# Patient Record
Sex: FEMALE | Race: WHITE | NOT HISPANIC OR LATINO | Employment: FULL TIME | URBAN - METROPOLITAN AREA
[De-identification: names, ages, dates, MRNs, and addresses within clinical notes are randomized per-mention and may not be internally consistent; named-entity substitution may affect disease eponyms.]

---

## 2017-11-01 ENCOUNTER — TRANSCRIBE ORDERS (OUTPATIENT)
Dept: ADMINISTRATIVE | Facility: HOSPITAL | Age: 52
End: 2017-11-01

## 2017-11-01 DIAGNOSIS — R55 SYNCOPE AND COLLAPSE: Primary | ICD-10-CM

## 2017-11-13 ENCOUNTER — HOSPITAL ENCOUNTER (OUTPATIENT)
Dept: RADIOLOGY | Facility: HOSPITAL | Age: 52
Discharge: HOME | End: 2017-11-13
Payer: COMMERCIAL

## 2017-11-13 DIAGNOSIS — R55 SYNCOPE AND COLLAPSE: ICD-10-CM

## 2017-11-13 PROCEDURE — A9585 GADOBUTROL INJECTION: HCPCS | Performed by: RADIOLOGY

## 2017-11-13 PROCEDURE — 70553 MRI BRAIN STEM W/O & W/DYE: CPT

## 2017-11-13 RX ADMIN — GADOBUTROL 10 ML: 604.72 INJECTION INTRAVENOUS at 20:53

## 2017-12-08 ENCOUNTER — OFFICE VISIT (OUTPATIENT)
Dept: URGENT CARE | Facility: MEDICAL CENTER | Age: 52
End: 2017-12-08
Payer: COMMERCIAL

## 2017-12-08 ENCOUNTER — APPOINTMENT (OUTPATIENT)
Dept: RADIOLOGY | Facility: MEDICAL CENTER | Age: 52
End: 2017-12-08
Payer: COMMERCIAL

## 2017-12-08 ENCOUNTER — TRANSCRIBE ORDERS (OUTPATIENT)
Dept: URGENT CARE | Facility: MEDICAL CENTER | Age: 52
End: 2017-12-08

## 2017-12-08 DIAGNOSIS — M79.605 PAIN OF LEFT LEG: ICD-10-CM

## 2017-12-08 PROCEDURE — 73590 X-RAY EXAM OF LOWER LEG: CPT

## 2017-12-08 PROCEDURE — 73630 X-RAY EXAM OF FOOT: CPT

## 2017-12-08 PROCEDURE — 73610 X-RAY EXAM OF ANKLE: CPT

## 2017-12-08 PROCEDURE — S9083 URGENT CARE CENTER GLOBAL: HCPCS

## 2017-12-08 PROCEDURE — G0382 LEV 3 HOSP TYPE B ED VISIT: HCPCS

## 2017-12-15 NOTE — PROGRESS NOTES
Assessment  1  Contusion of left leg (924 5) (S80 12XA)   2  Left ankle sprain (845 00) (K44 282U)    Plan   Left ankle sprain    · Ace Bandage; Status:Complete;   Done: 07DYL7815   · Air cast; Status:Complete;   Done: 21WPS5460   · Crutches; Status:Active; Requested for:10Dih9309;   * XR FOOT 3+ VIEW LEFT; Status:Resulted - Requires Verification;   Done: 83HFO1244 12:00AM ZPZ:[id number];UUIBSFQ; Stat;   For:Left leg pain; Ordered By:Chiquita Charlton;  * XR ANKLE 3+ VIEW LEFT; Status:Resulted - Requires Verification;   Done: 68OEU2810 12:00AM Due:90Crd5132; Ordered; For:Left leg pain; Ordered By:Chiquita Charlton;  * XR TIBIA FIBULA 2 VIEW LEFT; Status:Resulted - Requires Verification;   Done: 65YJO9236 12:00AM Due:38Ymq8734; Ordered; For:Left leg pain; Ordered By:Chiquita Charlton;    Discussion/Summary  Discussion Summary:   1  Left ankle/leg pain-X-rays as reviewed by myself shows no acute fracture- if the radiology read differs I will call you-Do RICE protocol at home (rest, ice, compression and elevation)-Wear ace/aircast and use crutches-Take ibuprofen every 6-8 hours as needed for pain with food-Follow-up with your primary doctor for re-evaluation within 1 week-If you have worsening symptoms or continue to have pain, you should follow-up with an orthopedist    Understands and agrees with treatment plan: The treatment plan was reviewed with the patient/guardian  The patient/guardian understands and agrees with the treatment plan      Chief Complaint  1  Ankle Pain  Chief Complaint Free Text Note Form: Pt c/o L ankle pain and tingling from tripping over an elliptical machine @1hour ago  History of Present Illness  HPI: Patient is a 50year old female with no pertinent history presenting with left ankle and calf pain for 1 hour  States she tripped over her elliptical machine at home but does not know how she injured herself  She denies head trauma  Her pain is 4/10 at rest and 7/10 when ambulating   The pain is a shooting pain that occurs down her lateral calf to her lateral left ankle  She was able to ambulate immediately following the injury and currently  The ankle was immediately painful and began to swell within minutes  She applied ice to address the swelling  Reports decreased ROM of left dorsiflexion and plantarflexion  Reports numbness and tingling down her left lateral calf and in her left hallux and 2nd and 3rd digits  Denies fracture or injury to the left ankle or foot  Hospital Based Practices Required Assessment:  Pain Assessment  the patient states they have pain  The pain is located in the l ankle  The pain radiates to the calf  (on a scale of 0 to 10, the patient rates the pain at 4 )  Abuse And Domestic Violence Screen   Yes, the patient is safe at home  --   Domestic violence screen not done today  Reason DV Screen not done: not alone   Depression And Suicide Screen  Suicide screen not done today  -- Reason suicide screen not done: not alone  Prefered Language is  english  Primary Language is  english  Review of Systems  Focused-Female:  Constitutional: no fever-- and-- no chills  Cardiovascular: no chest pain,-- no intermittent leg claudication-- and-- no palpitations  Respiratory: no shortness of breath-- and-- no shortness of breath during exertion  Gastrointestinal: no abdominal pain,-- no nausea,-- no vomiting-- and-- no diarrhea  Musculoskeletal: joint swelling-- and-- joint stiffness  Neurological: numbness-- and-- dizziness, but-- as noted in HPI  Active Problems  1  Atypical chest pain (786 59) (R07 89)   2  Contusion of left thumb without damage to nail, initial encounter (923 3) (S60 012A)   3  De Quervain's tenosynovitis (727 04) (M65 4)   4  Localized primary osteoarthrosis of carpometacarpal joint of right wrist (715 14) (M19 031)   5  Osteoarthritis (715 90) (M19 90)   6  Palpitations (785 1) (R00 2)   7  Postoperative examination (V67 00) (Z09)   8   Pre-operative cardiovascular examination (V72 81) (Z01 810)   9  Sinus bradycardia (427 89) (R00 1)   10  Sprain of ulnar collateral ligament of elbow, left, initial encounter (841 1) (S53 442A)   11  Supraventricular tachycardia (427 89) (I47 1)   12  Thumb injury (959 5) (S69 90XA)    Past Medical History  1  History of Benign essential hypertension (401 1) (I10)   2  History of Calculus of gallbladder with chronic cholecystitis without obstruction (574 10) (K80 10)   3  History of Denial Of Any Significant Medical History  Active Problems And Past Medical History Reviewed: The active problems and past medical history were reviewed and updated today  Family History  Mother    1  Family history of Chronic Obstructive Pulmonary Disease  Father    2  Family history of Reported Family History Of Heart Disease  Maternal Uncle    3  Family history of Cirrhosis   4  Family history of Prostate Cancer (P78 16)  Family History Reviewed: The family history was reviewed and updated today  Social History   · Being A Social Drinker   · Former smoker (R40 58) (I94 321)   · Marital History - Single   · Never Used Drugs   · Uses Safety Equipment - Seatbelts  Social History Reviewed: The social history was reviewed and updated today  The social history was reviewed and is unchanged  Surgical History  1  History of Catheter Ablation Atrioventricular Node   2  History of Cervical Vertebral Fusion   3  History of Lumbar Vertebral Fusion  Surgical History Reviewed: The surgical history was reviewed and updated today  Current Meds   1  Calcium 1200 7276-7360 MG-UNIT Oral Tablet Chewable; Therapy: (Recorded:45Brb4080) to Recorded   2  Omeprazole 40 MG Oral Capsule Delayed Release; TAKE 1 CAPSULE TWICE DAILY; Therapy: (Recorded:53Qyy9813) to Recorded   3  VESIcare 5 MG Oral Tablet; TAKE 1 TABLET DAILY; Therapy: 23KBB8732 to Recorded  Medication List Reviewed: The medication list was reviewed and updated today  color  No rashes      Signatures   Electronically signed by : Amelia Sheikh [facility]; Dec  8 2017  7:36PM EST                       (Author)    Electronically signed by : ANGELICA Young ; Dec 14 2017 11:43AM EST                       (Co-author)

## 2018-01-12 ENCOUNTER — TRANSCRIBE ORDERS (OUTPATIENT)
Dept: ADMINISTRATIVE | Facility: HOSPITAL | Age: 53
End: 2018-01-12

## 2018-01-12 ENCOUNTER — APPOINTMENT (OUTPATIENT)
Dept: LAB | Facility: HOSPITAL | Age: 53
End: 2018-01-12
Payer: COMMERCIAL

## 2018-01-12 DIAGNOSIS — R55 SYNCOPE AND COLLAPSE: ICD-10-CM

## 2018-01-12 DIAGNOSIS — R55 SYNCOPE AND COLLAPSE: Primary | ICD-10-CM

## 2018-01-12 LAB
ALBUMIN SERPL BCP-MCNC: 3.9 G/DL (ref 3.5–5)
ALP SERPL-CCNC: 69 U/L (ref 46–116)
ALT SERPL W P-5'-P-CCNC: 25 U/L (ref 12–78)
ANION GAP SERPL CALCULATED.3IONS-SCNC: 8 MMOL/L (ref 4–13)
AST SERPL W P-5'-P-CCNC: 17 U/L (ref 5–45)
BASOPHILS # BLD AUTO: 0.02 THOUSANDS/ΜL (ref 0–0.1)
BASOPHILS NFR BLD AUTO: 0 % (ref 0–1)
BILIRUB SERPL-MCNC: 0.7 MG/DL (ref 0.2–1)
BUN SERPL-MCNC: 13 MG/DL (ref 5–25)
CALCIUM SERPL-MCNC: 9.3 MG/DL (ref 8.3–10.1)
CHLORIDE SERPL-SCNC: 106 MMOL/L (ref 100–108)
CO2 SERPL-SCNC: 27 MMOL/L (ref 21–32)
CREAT SERPL-MCNC: 0.84 MG/DL (ref 0.6–1.3)
EOSINOPHIL # BLD AUTO: 0.12 THOUSAND/ΜL (ref 0–0.61)
EOSINOPHIL NFR BLD AUTO: 2 % (ref 0–6)
ERYTHROCYTE [DISTWIDTH] IN BLOOD BY AUTOMATED COUNT: 13.8 % (ref 11.6–15.1)
GFR SERPL CREATININE-BSD FRML MDRD: 81 ML/MIN/1.73SQ M
GLUCOSE SERPL-MCNC: 108 MG/DL (ref 65–140)
HCT VFR BLD AUTO: 42.4 % (ref 34.8–46.1)
HGB BLD-MCNC: 14.7 G/DL (ref 11.5–15.4)
LYMPHOCYTES # BLD AUTO: 2.05 THOUSANDS/ΜL (ref 0.6–4.47)
LYMPHOCYTES NFR BLD AUTO: 33 % (ref 14–44)
MCH RBC QN AUTO: 30 PG (ref 26.8–34.3)
MCHC RBC AUTO-ENTMCNC: 34.7 G/DL (ref 31.4–37.4)
MCV RBC AUTO: 87 FL (ref 82–98)
MONOCYTES # BLD AUTO: 0.47 THOUSAND/ΜL (ref 0.17–1.22)
MONOCYTES NFR BLD AUTO: 8 % (ref 4–12)
NEUTROPHILS # BLD AUTO: 3.53 THOUSANDS/ΜL (ref 1.85–7.62)
NEUTS SEG NFR BLD AUTO: 57 % (ref 43–75)
PLATELET # BLD AUTO: 203 THOUSANDS/UL (ref 149–390)
PMV BLD AUTO: 9.9 FL (ref 8.9–12.7)
POTASSIUM SERPL-SCNC: 3.8 MMOL/L (ref 3.5–5.3)
PROT SERPL-MCNC: 7.5 G/DL (ref 6.4–8.2)
RBC # BLD AUTO: 4.9 MILLION/UL (ref 3.81–5.12)
SODIUM SERPL-SCNC: 141 MMOL/L (ref 136–145)
T4 FREE SERPL-MCNC: 0.86 NG/DL (ref 0.76–1.46)
TROPONIN I SERPL-MCNC: <0.02 NG/ML
TSH SERPL DL<=0.05 MIU/L-ACNC: 1.59 UIU/ML (ref 0.36–3.74)
WBC # BLD AUTO: 6.19 THOUSAND/UL (ref 4.31–10.16)

## 2018-01-12 PROCEDURE — 85025 COMPLETE CBC W/AUTO DIFF WBC: CPT

## 2018-01-12 PROCEDURE — 84484 ASSAY OF TROPONIN QUANT: CPT

## 2018-01-12 PROCEDURE — 84443 ASSAY THYROID STIM HORMONE: CPT

## 2018-01-12 PROCEDURE — 80053 COMPREHEN METABOLIC PANEL: CPT

## 2018-01-12 PROCEDURE — 36415 COLL VENOUS BLD VENIPUNCTURE: CPT

## 2018-01-12 PROCEDURE — 84439 ASSAY OF FREE THYROXINE: CPT

## 2018-01-23 VITALS
SYSTOLIC BLOOD PRESSURE: 120 MMHG | RESPIRATION RATE: 16 BRPM | DIASTOLIC BLOOD PRESSURE: 66 MMHG | BODY MASS INDEX: 32.21 KG/M2 | TEMPERATURE: 98.5 F | WEIGHT: 225 LBS | HEIGHT: 70 IN | OXYGEN SATURATION: 99 % | HEART RATE: 76 BPM

## 2018-05-03 ENCOUNTER — TRANSCRIBE ORDERS (OUTPATIENT)
Dept: ADMINISTRATIVE | Facility: HOSPITAL | Age: 53
End: 2018-05-03

## 2018-05-03 DIAGNOSIS — M75.102 ROTATOR CUFF SYNDROME OF LEFT SHOULDER: Primary | ICD-10-CM

## 2018-05-08 ENCOUNTER — EVALUATION (OUTPATIENT)
Dept: PHYSICAL THERAPY | Facility: REHABILITATION | Age: 53
End: 2018-05-08
Payer: COMMERCIAL

## 2018-05-08 DIAGNOSIS — M25.512 ACUTE PAIN OF LEFT SHOULDER: Primary | ICD-10-CM

## 2018-05-08 PROCEDURE — G8984 CARRY CURRENT STATUS: HCPCS | Performed by: PHYSICAL THERAPIST

## 2018-05-08 PROCEDURE — 97162 PT EVAL MOD COMPLEX 30 MIN: CPT | Performed by: PHYSICAL THERAPIST

## 2018-05-08 PROCEDURE — 97140 MANUAL THERAPY 1/> REGIONS: CPT | Performed by: PHYSICAL THERAPIST

## 2018-05-08 PROCEDURE — 97110 THERAPEUTIC EXERCISES: CPT | Performed by: PHYSICAL THERAPIST

## 2018-05-08 PROCEDURE — G8985 CARRY GOAL STATUS: HCPCS | Performed by: PHYSICAL THERAPIST

## 2018-05-08 NOTE — LETTER
May 9, 2018    MD Phyllis Meyer [address]  [address]     Patient: Sascha Lao   YOB: 1965   Date of Visit: 2018     Encounter Diagnosis     ICD-10-CM    1  Acute pain of left shoulder M25 512        Dear Dr Devries La:    Please review the attached Plan of Care from [facility] recent visit  Please verify that you agree therapy should continue by signing the attached document and sending it back to our office  If you have any questions or concerns, please don't hesitate to call  Sincerely,    Ferny Rojas, PT      Referring Provider:      I certify that I have read the below Plan of Care and certify the need for these services furnished under this plan of treatment while under my care  MD Phyllis Meyer [address]  [address]   VIA Facsimile: [phone]          PT Evaluation     Today's date: 2018  Patient name: Sascha Lao  : 1965  MRN: [id number]  Referring provider: Skye Phillips MD  Dx:   Encounter Diagnosis     ICD-10-CM    1  Acute pain of left shoulder M25 512        Start Time: 1450  Stop Time: 1545  Total time in clinic (min): 55 minutes    Assessment  Impairments: abnormal muscle tone, abnormal or restricted ROM, activity intolerance, impaired physical strength, lacks appropriate home exercise program, pain with function and scapular dyskinesis  Functional limitations: ADL's, any activity with overhead reaching, certain work tasks  Assessment details: Sascha Lao is a 48 y o  female who presents with pain, decreased strength, decreased ROM, decreased joint mobility and postural  dysfunction  Due to these impairments, Patient has difficulty performing a/iadls and work-related activities  Patient's clinical presentation is consistent with their referring diagnosis of left shoulder pain    Patient would benefit from skilled physical therapy to address their aforementioned impairments, improve their level of function and to improve their overall quality of life  Understanding of Dx/Px/POC: excellent   Prognosis: good    Goals  Short Term:  Pt will report decreased levels of pain by at least 2 subjective ratings in 4 weeks  Pt will demonstrate improved ROM by at least 10 degrees in 4 weeks  Pt will improve stength by 1/2 grade MMT  Long Term:   Pt will be independent in their HEP in 8 weeks  Pt will demonstrate improved FOTO, > 67  Pt will return to maximal functional with all overhead activities  Pt will perform all job duties without pain or restriction  Plan  Patient would benefit from: PT eval and skilled PT  Planned modality interventions: low level laser therapy  Planned therapy interventions: home exercise program, therapeutic exercise, therapeutic activities, stretching, strengthening, postural training, patient education, neuromuscular re-education, manual therapy, joint mobilization, IADL retraining, body mechanics training and functional ROM exercises  Frequency: 2x week  Duration in weeks: 12  Treatment plan discussed with: patient        Subjective Evaluation    History of Present Illness  Date of onset: 2018  Mechanism of injury: unknown  Not a recurrent problem   Quality of life: fair    Pain  Current pain ratin  At best pain ratin  At worst pain rating: 10  Quality: knife-like, dull ache, sharp and radiating  Aggravating factors: overhead activity and lifting  Progression: no change    Hand dominance: right      Diagnostic Tests    FCE comments: Scheduled for an MRI on 2018 Treatments  Previous treatment: injection treatment  Patient Goals  Patient goals for therapy: decreased pain, increased motion, increased strength and independence with ADLs/IADLs          Objective     Static Posture     Shoulders  Depressed and rounded  Palpation   Left   Tenderness of the infraspinatus and teres minor     Trigger point to infraspinatus, teres minor and upper trapezius  Cervical/Thoracic Screen   Cervical range of motion within normal limits    Neurological Testing     Sensation     Shoulder   Left Shoulder   Intact: light touch and proprioception    Right Shoulder   Intact: light touch and proprioception    Reflexes   Left   Biceps (C5/C6): normal (2+)  Brachioradialis (C6): normal (2+)    Right   Biceps (C5/C6): normal (2+)  Brachioradialis (C6): normal (2+)    Active Range of Motion   Left Shoulder   Flexion: 86 degrees   Abduction: 71 degrees   External rotation 45°:  21 degrees   Internal rotation 45°:  72 degrees     Right Shoulder   Normal active range of motion    Passive Range of Motion   Left Shoulder   Flexion: 92 degrees with pain  Abduction: 96 degrees with pain  External rotation 45°:  51 degrees with pain  Internal rotation 45°:  73 degrees WFL    Right Shoulder   Normal passive range of motion    Scapular Mobility   Left Shoulder   Scapular mobility: poor    Right Shoulder   Scapular mobility: WFL    Strength/Myotome Testing     Left Shoulder     Planes of Motion   Flexion: 2+   Extension: 4-   Abduction: 2+   Adduction: 4-   External rotation at 45°:  2   Internal rotation at 45°:  4+     Right Shoulder   Normal muscle strength    Left Elbow   Normal strength    Right Elbow   Normal strength    Tests     Left Shoulder   Positive drop arm and empty can         Flowsheet Rows      Most Recent Value   PT/OT G-Codes   Current Score  45   Projected Score  67   FOTO information reviewed  Yes   Assessment Type  Evaluation   G code set  Carrying, Moving & Handling Objects   Carrying, Moving and Handling Objects Current Status ()  CL   Carrying, Moving and Handling Objects Goal Status ()  CJ          Precautions: standard     Daily Treatment Diary     Manual  5/8            LUE PROM LB            STM upper mid trap,scalejurgen LB                                                       Exercise Diary  5/8 pulleys 5 min            Table slides Flex/abd 2x10            scap squeezes  3x15                                                                                                                                                                                                                                             Modalities

## 2018-05-14 ENCOUNTER — HOSPITAL ENCOUNTER (OUTPATIENT)
Dept: RADIOLOGY | Facility: HOSPITAL | Age: 53
Discharge: HOME | End: 2018-05-14
Payer: COMMERCIAL

## 2018-05-14 DIAGNOSIS — M75.102 ROTATOR CUFF SYNDROME OF LEFT SHOULDER: ICD-10-CM

## 2018-05-14 PROCEDURE — 73221 MRI JOINT UPR EXTREM W/O DYE: CPT

## 2018-05-15 ENCOUNTER — OFFICE VISIT (OUTPATIENT)
Dept: PHYSICAL THERAPY | Facility: REHABILITATION | Age: 53
End: 2018-05-15
Payer: COMMERCIAL

## 2018-05-15 DIAGNOSIS — M25.512 ACUTE PAIN OF LEFT SHOULDER: Primary | ICD-10-CM

## 2018-05-15 PROCEDURE — 97110 THERAPEUTIC EXERCISES: CPT | Performed by: PHYSICAL THERAPY ASSISTANT

## 2018-05-15 PROCEDURE — 97140 MANUAL THERAPY 1/> REGIONS: CPT | Performed by: PHYSICAL THERAPY ASSISTANT

## 2018-05-15 PROCEDURE — 97112 NEUROMUSCULAR REEDUCATION: CPT | Performed by: PHYSICAL THERAPY ASSISTANT

## 2018-05-15 NOTE — PROGRESS NOTES
Daily Note     Today's date: 5/15/2018  Patient name: Fouzia Wall  : 1965  MRN: [id number]  Referring provider: Yadi Franklin MD  Dx:   Encounter Diagnosis     ICD-10-CM    1  Acute pain of left shoulder M25 512                   Subjective: Pt reports good compliance with HEP  Pt states she feels the HEP has really helped to improve her ROM since LV, however pain remains the same  Objective: See treatment diary below    Daily Treatment Diary     Manual  5/8 5/15           LUE PROM DOROTHY RK           STM upper mid trap,aroldo DE LA CRUZ RK                                                      Exercise Diary  5/8 5/15           pulleys 5 min 5'           Table slides Flex/abd/ 2x10 2x10  Add er           scap squeezes  3x15 3" 2x15           Cane AAROM  Flex, ABD, ER  x10                                                                                                                                                                                                                               Modalities                                                       Assessment: Tolerated treatment well  Moderate guarding noted during PROM  Good tolerance to progression of TE this session  Patient demonstrated fatigue post treatment, exhibited good technique with therapeutic exercises and would benefit from continued PT      Plan: Continue per plan of care  Progress treatment as tolerated

## 2018-05-18 ENCOUNTER — OFFICE VISIT (OUTPATIENT)
Dept: PHYSICAL THERAPY | Facility: REHABILITATION | Age: 53
End: 2018-05-18
Payer: COMMERCIAL

## 2018-05-18 DIAGNOSIS — M25.512 ACUTE PAIN OF LEFT SHOULDER: Primary | ICD-10-CM

## 2018-05-18 PROCEDURE — 97140 MANUAL THERAPY 1/> REGIONS: CPT | Performed by: PHYSICAL THERAPIST

## 2018-05-18 PROCEDURE — 97110 THERAPEUTIC EXERCISES: CPT | Performed by: PHYSICAL THERAPIST

## 2018-05-18 NOTE — PROGRESS NOTES
Daily Note     Today's date: 2018  Patient name: Andreea Tran  : 1965  MRN: [id number]  Referring provider: Bradley Gramajo MD  Dx:   Encounter Diagnosis     ICD-10-CM    1  Acute pain of left shoulder M25 512        Start Time: 910  Stop Time: 950  Total time in clinic (min): 40 minutes    Subjective: Patient notes receiving a cortizone shot in her L shoulder on 18 at 11AM  Stiffness and pain noted at her L UE with pain reaching a 3 out of 10  Objective: See treatment diary below      Assessment: Tolerated treatment well  Patient demonstrated fatigue post treatment, exhibited good technique with therapeutic exercises and would benefit from continued PT      Plan: Continue per plan of care  Progress treatment as tolerated          Daily Treatment Diary     Manual  5/8 5/15 5/18          LUE PROM LB RK LB          STM upper mid trap,aroldo LB RK LB                                                     Exercise Diary  5/8 5/15 5/18          pulleys 5 min 5' 5'          Wallslides Flex/abd/ 2x10 2x10  Add er           scap squeezes   Ext,  3x15 3" 2x15 3x15 OTB          Cane AAROM  Flex, ABD, ER  x10 x10                                                                                                                                                                                                                              Modalities

## 2018-05-22 ENCOUNTER — OFFICE VISIT (OUTPATIENT)
Dept: PHYSICAL THERAPY | Facility: REHABILITATION | Age: 53
End: 2018-05-22
Payer: COMMERCIAL

## 2018-05-22 DIAGNOSIS — M25.512 ACUTE PAIN OF LEFT SHOULDER: Primary | ICD-10-CM

## 2018-05-22 PROCEDURE — 97110 THERAPEUTIC EXERCISES: CPT | Performed by: PHYSICAL THERAPIST

## 2018-05-22 PROCEDURE — 97140 MANUAL THERAPY 1/> REGIONS: CPT | Performed by: PHYSICAL THERAPIST

## 2018-05-22 NOTE — PROGRESS NOTES
Daily Note     Today's date: 2018  Patient name: James Mccoy  : 1965  MRN: [id number]  Referring provider: Pippa Lobato MD  Dx:   Encounter Diagnosis     ICD-10-CM    1  Acute pain of left shoulder M25 512        Start Time: 1530  Stop Time: 1625  Total time in clinic (min): 55 minutes    Subjective: I hurt my shoulder trying to help my friend move a table  Objective: See treatment diary below      Assessment: Tolerated treatment fair  Patient exhibited good technique with therapeutic exercises and would benefit from continued PT      Plan: Continue per plan of care  Progress treatment as tolerated      PRECAUTIONS STD   Daily Treatment Diary     Manual  5/8 5/15 5/18 5/22         LUE PROM LB RK LB LB         STM upper mid trap,scalenes LB RK LB LB         scap mobs grade2     LB         AC jt mob grade 1&2    LB                          Exercise Diary  5/8 5/15 5/18 5/22         pulleys 5 min 5' 5' 6'         Wallslides Flex/abd/ 2x10 2x10  Add er  2x10  wallwalks abd 10x         scap squeezes   Ext,  3x15 3" 2x15 3x15 OTB 3x15  GTB         Cane AAROM  Flex, ABD, ER  x10 x10          Prone rows     NV         Lawnmowers     NV                                                                                                                                                                                                   Modalities

## 2018-05-24 ENCOUNTER — OFFICE VISIT (OUTPATIENT)
Dept: PHYSICAL THERAPY | Facility: REHABILITATION | Age: 53
End: 2018-05-24
Payer: COMMERCIAL

## 2018-05-24 DIAGNOSIS — M25.512 ACUTE PAIN OF LEFT SHOULDER: Primary | ICD-10-CM

## 2018-05-24 PROCEDURE — 97110 THERAPEUTIC EXERCISES: CPT | Performed by: PHYSICAL THERAPIST

## 2018-05-24 PROCEDURE — 97140 MANUAL THERAPY 1/> REGIONS: CPT | Performed by: PHYSICAL THERAPIST

## 2018-05-24 NOTE — PROGRESS NOTES
Daily Note     Today's date: 2018  Patient name: Madelin Joyce  : 1965  MRN: [id number]  Referring provider: Augie Gitelman, MD  Dx:   Encounter Diagnosis     ICD-10-CM    1  Acute pain of left shoulder M25 512        Start Time: 1440  Stop Time: 1535  Total time in clinic (min): 55 minutes    Subjective: Patient notes feeling well and no symptoms of pain  Objective: See treatment diary below      Assessment: Tolerated treatment well  Patient demonstrated fatigue post treatment, exhibited good technique with therapeutic exercises and would benefit from continued PT  Patient AROM is increasing well, further strengthen exercises were added to HEP  Plan: Continue per plan of care  Progress treatment as tolerated      PRECAUTIONS STD   Daily Treatment Diary     Manual  5/8 5/15 5/18 5/22 5/24        LUE PROM LB RK LB LB CA        STM upper mid trap,scalenes LB RK LB LB LB        scap mobs grade2     LB         AC jt mob grade 1&2    LB CA                         Exercise Diary  5/8 5/15 5/18 5/22 5/24        pulleys 5 min 5' 5' 6' UBE 8 min        Wallslides Flex/abd/ 2x10 2x10  Add er  2x10  wallwalks abd 10x         scap squeezes   Ext, lawnmowers 3x15 3" 2x15 3x15 OTB 3x15  GTB 2x15  GTB  addedlawnmowers        Cane AAROM  Flex, ABD, ER, IR, ext,  x10 x10  10x  5"        Prone rows     NV 2x15 #2                                  Scap  IR, ER, & EXT     2x15 PTB, GTB                                                                                                                                                                        Modalities

## 2018-05-30 ENCOUNTER — APPOINTMENT (OUTPATIENT)
Dept: PHYSICAL THERAPY | Facility: REHABILITATION | Age: 53
End: 2018-05-30
Payer: COMMERCIAL

## 2018-06-04 ENCOUNTER — OFFICE VISIT (OUTPATIENT)
Dept: PHYSICAL THERAPY | Facility: REHABILITATION | Age: 53
End: 2018-06-04
Payer: COMMERCIAL

## 2018-06-04 DIAGNOSIS — M25.512 ACUTE PAIN OF LEFT SHOULDER: Primary | ICD-10-CM

## 2018-06-04 PROCEDURE — 97110 THERAPEUTIC EXERCISES: CPT | Performed by: PHYSICAL THERAPIST

## 2018-06-04 PROCEDURE — 97140 MANUAL THERAPY 1/> REGIONS: CPT | Performed by: PHYSICAL THERAPIST

## 2018-06-04 NOTE — PROGRESS NOTES
Daily Note     Today's date: 2018  Patient name: Tammy Cruz  : 1965  MRN: [id number]  Referring provider: Alejandro Maciel MD  Dx:   Encounter Diagnosis     ICD-10-CM    1  Acute pain of left shoulder M25 512        Start Time: 731  Stop Time: 818  Total time in clinic (min): 47 minutes    Subjective: Pre-treatment patient notes feeling a 3/10 pain her left shoulder  Over the past weekend, patient reports mowing the lawn and gardening with minor discomfort  Objective: See treatment diary below      Assessment: Tolerated treatment well with tactile cues, especially with scapular retraction  Patient's ROM continues to improve, there is persistent  pain is felt upon end range of motion  Patient's exercise weight was increased from 2 lbs to 3 lbs in order to improve shoulder strength  Patient demonstrated fatigue post treatment, exhibited good technique with therapeutic exercises and would benefit from continued PT      Plan: Continue per plan of care  Progress treatment as tolerated        PRECAUTIONS STD   Daily Treatment Diary     Manual  5/8 5/15 5/18 5/22 5/24 6/4       LUE PROM LB RK LB LB CA CA       STM upper mid trap,scalenes LB RK LB LB LB CA       scap mobs grade2     LB         AC jt mob grade 1&2    LB CA CA                        Exercise Diary  5/8 5/15 5/18 5/22 5/24 64       pulleys 5 min 5' 5' 6' UBE 8 min UBE 8 min       Wallslides Flex/abd/ 2x10 2x10  Add er  2x10  wallwalks abd 10x         scap squeezes   Ext, lawnmowers 3x15 3" 2x15 3x15 OTB 3x15  GTB 2x15  GTB  addedlawnmowers 2x15 GTB       Cane AAROM  Flex, ABD, ER, IR, ext,  x10 x10  10x  5" 10x10"       Prone rows     NV 2x15 #2 1x15 #2 1x15 #3                                 Scap  IR, ER, & EXT     2x15 PTB, GTB 2x15 PTB ER 2x15 ER/ EXT GTB Modalities

## 2018-06-05 PROCEDURE — G8985 CARRY GOAL STATUS: HCPCS | Performed by: PHYSICAL THERAPIST

## 2018-06-05 PROCEDURE — G8984 CARRY CURRENT STATUS: HCPCS | Performed by: PHYSICAL THERAPIST

## 2018-06-07 ENCOUNTER — OFFICE VISIT (OUTPATIENT)
Dept: PHYSICAL THERAPY | Facility: REHABILITATION | Age: 53
End: 2018-06-07
Payer: COMMERCIAL

## 2018-06-07 DIAGNOSIS — M25.512 ACUTE PAIN OF LEFT SHOULDER: Primary | ICD-10-CM

## 2018-06-07 PROCEDURE — 97110 THERAPEUTIC EXERCISES: CPT | Performed by: PHYSICAL THERAPY ASSISTANT

## 2018-06-07 PROCEDURE — 97112 NEUROMUSCULAR REEDUCATION: CPT | Performed by: PHYSICAL THERAPY ASSISTANT

## 2018-06-07 PROCEDURE — 97140 MANUAL THERAPY 1/> REGIONS: CPT | Performed by: PHYSICAL THERAPY ASSISTANT

## 2018-06-07 NOTE — PROGRESS NOTES
Daily Note     Today's date: 2018  Patient name: Mekhi Ferrara  : 1965  MRN: [id number]  Referring provider: Benito Streeter MD  Dx:   Encounter Diagnosis     ICD-10-CM    1  Acute pain of left shoulder M25 512                   Subjective: Pt reports minimal pain pre treatment today  Reports improved tolerance for functional activities I e  Cleaning, gardening, yard work  Objective: See treatment diary below      Assessment: Tolerated treatment well  Patient's ROM continues to improve, however she continues with pain at end range PROM, bandar with abduction  Patient demonstrated fatigue post treatment, exhibited good technique with therapeutic exercises and would benefit from continued PT      Plan: Continue per plan of care  Progress treatment as tolerated        PRECAUTIONS STD   Daily Treatment Diary     Manual  5/8 5/15 5/18 5/22 5/24 6/4 6/7      LUE PROM LB RK LB LB CA CA RK      STM upper mid trap,scalenes LB RK LB LB LB CA RK      scap mobs grade2     LB         AC jt mob grade 1&2    LB CA CA                        Exercise Diary  5/8 5/15 5/18 5/22 5/24 6/4 6/7      pulleys 5 min 5' 5' 6' UBE 8 min UBE 8 min UBE  8'      Wallslides Flex/abd/ 2x10 2x10  Add er  2x10  wallwalks abd 10x         scap squeezes   Ext, lawnmowers 3x15 3" 2x15 3x15 OTB 3x15  GTB 2x15  GTB  addedlawnmowers 2x15 GTB 2x15  GTB      Cane AAROM  Flex, ABD, ER, IR, ext,  x10 x10  10x  5" 10x10" 10x10"      Prone rows     NV 2x15 #2 1x15 #2 1x15 #3 2x15  3#                                Scap  IR, ER, & EXT     2x15 PTB, GTB 2x15 PTB ER 2x15 ER/ EXT GTB 2x15                                                                                                                                                                      Modalities

## 2018-06-12 ENCOUNTER — OFFICE VISIT (OUTPATIENT)
Dept: PHYSICAL THERAPY | Facility: REHABILITATION | Age: 53
End: 2018-06-12
Payer: COMMERCIAL

## 2018-06-12 DIAGNOSIS — M25.512 ACUTE PAIN OF LEFT SHOULDER: Primary | ICD-10-CM

## 2018-06-12 PROCEDURE — 97110 THERAPEUTIC EXERCISES: CPT | Performed by: PHYSICAL THERAPIST

## 2018-06-12 PROCEDURE — 97140 MANUAL THERAPY 1/> REGIONS: CPT | Performed by: PHYSICAL THERAPIST

## 2018-06-12 PROCEDURE — 97112 NEUROMUSCULAR REEDUCATION: CPT | Performed by: PHYSICAL THERAPIST

## 2018-06-12 NOTE — PROGRESS NOTES
Daily Note     Today's date: 2018  Patient name: Dione Marsh  : 1965  MRN: [id number]  Referring provider: Kel Martinez MD  Dx:   Encounter Diagnosis     ICD-10-CM    1  Acute pain of left shoulder M25 512        Start Time: 1400  Stop Time: 1445  Total time in clinic (min): 45 minutes    Subjective: I am a little sore but I think it is mainly from lifting boxes  Objective: See treatment diary below      Assessment: Tolerated treatment well  Patient demonstrated fatigue post treatment, exhibited good technique with therapeutic exercises and would benefit from continued PT      Plan: Continue per plan of care  Progress treatment as tolerated             PRECAUTIONS STD   Daily Treatment Diary     Manual  5/8 5/15 5/18 5/22 5/24 6/4 6/7 6/12     LUE PROM LB RK LB LB CA CA RK LB     STM upper mid trap,scalenes LB RK LB LB LB CA RK LB     scap mobs grade2     LB         AC jt mob grade 1&2    LB CA CA                        Exercise Diary  5/8 5/15 5/18 5/22 5/24 6/4 6/7 6/12     pulleys 5 min 5' 5' 6' UBE 8 min UBE 8 min UBE  8' UBE  8'     Wallslides Flex/abd/ 2x10 2x10  Add er  2x10  wallwalks abd 10x    10x     scap squeezes   Ext, lawnmowers 3x15 3" 2x15 3x15 OTB 3x15  GTB 2x15  GTB  addedlawnmowers 2x15 GTB 2x15  GTB      AROM  Flex, ABD, ext,  x10 x10  10x  5" 10x10" 2x10 1#      Prone rows     NV 2x15 #2 1x15 #2 1x15 #3 2x15  3# 2x15 4#                               T band   IR, ER, & EXT     2x15 PTB, GTB 2x15 PTB ER 2x15 ER/ EXT GTB 2x15 3x10 OTB     blackburns 1,2,3,4  2# on 1        2x20     Body blade         Flex abd 3x40"                                                                                                                                           Modalities

## 2018-06-15 ENCOUNTER — APPOINTMENT (OUTPATIENT)
Dept: PHYSICAL THERAPY | Facility: REHABILITATION | Age: 53
End: 2018-06-15
Payer: COMMERCIAL

## 2018-06-19 ENCOUNTER — OFFICE VISIT (OUTPATIENT)
Dept: PHYSICAL THERAPY | Facility: REHABILITATION | Age: 53
End: 2018-06-19
Payer: COMMERCIAL

## 2018-06-19 ENCOUNTER — APPOINTMENT (OUTPATIENT)
Dept: PHYSICAL THERAPY | Facility: REHABILITATION | Age: 53
End: 2018-06-19
Payer: COMMERCIAL

## 2018-06-19 DIAGNOSIS — M25.512 ACUTE PAIN OF LEFT SHOULDER: Primary | ICD-10-CM

## 2018-06-19 PROCEDURE — 97110 THERAPEUTIC EXERCISES: CPT | Performed by: PHYSICAL THERAPIST

## 2018-06-19 PROCEDURE — 97112 NEUROMUSCULAR REEDUCATION: CPT | Performed by: PHYSICAL THERAPIST

## 2018-06-19 PROCEDURE — 97140 MANUAL THERAPY 1/> REGIONS: CPT | Performed by: PHYSICAL THERAPIST

## 2018-06-19 NOTE — PROGRESS NOTES
Daily Note     Today's date: 2018  Patient name: Dione Marsh  : 1965  MRN: [id number]  Referring provider: Kel Martinez MD  Dx: No diagnosis found  Subjective:  Patient reports feeling no pain  Pain noted knee pain upon walking and will visit MD if pain persists  Objective: See treatment diary below      Assessment: Tolerated treatment well,   Upon assessment, patient's L AC joint was hypomobile  Patients shoulder PROM/AROM improved with grade 2 AC joint mobilizations, specifically with attaining 180 degrees of abduction  Patient's exercise technique benefited from verbal cues  Patient demonstrated fatigue post treatment and would benefit from continued PT  Plan: Continue per plan of care  Progress treatment as tolerated        PRECAUTIONS STD   Daily Treatment Diary     Manual  5/8 5/15 5/18 5/22 5/24 6/4 6/7 6/12 6/19    LUE PROM LB RK LB LB CA CA RK LB CA    STM upper mid trap,scalenes LB RK LB LB LB CA RK LB     scap mobs grade2     LB         AC jt mob grade 1&2    LB CA CA   CA                     Exercise Diary  5/8 5/15 5/18 5/22 5/24 6/4 6/7 6/12 6/19    pulleys 5 min 5' 5' 6' UBE 8 min UBE 8 min UBE  8' UBE  8' UBE    8'    Wallslides Flex/abd/ 2x10 2x10  Add er  2x10  wallwalks abd 10x    10x     scap squeezes   Ext, lawnmowers 3x15 3" 2x15 3x15 OTB 3x15  GTB 2x15  GTB  addedlawnmowers 2x15 GTB 2x15  GTB  2x20 GTB    AROM  Flex, ABD, ext,  x10 x10  10x  5" 10x10" 2x10 1#      Prone rows     NV 2x15 #2 1x15 #2 1x15 #3 2x15  3# 2x15 4# 1x15 #4      1x15 #5                              T band   IR, ER, & EXT     2x15 PTB, GTB 2x15 PTB ER 2x15 ER/ EXT GTB 2x15 3x10 OTB 2x15 OTB    blackburns 1,2,3,4  2# on 1        2x20 2x20  #1 on 2 & 3    Body blade         Flex abd 3x40" Flex abd  IR/ER 3x30"                                                                                                                                          Modalities

## 2018-06-21 ENCOUNTER — APPOINTMENT (OUTPATIENT)
Dept: PHYSICAL THERAPY | Facility: REHABILITATION | Age: 53
End: 2018-06-21
Payer: COMMERCIAL

## 2018-06-25 ENCOUNTER — TRANSCRIBE ORDERS (OUTPATIENT)
Dept: ADMINISTRATIVE | Facility: HOSPITAL | Age: 53
End: 2018-06-25

## 2018-06-25 DIAGNOSIS — S83.242A ACUTE MEDIAL MENISCUS TEAR OF LEFT KNEE, INITIAL ENCOUNTER: Primary | ICD-10-CM

## 2018-06-27 ENCOUNTER — OFFICE VISIT (OUTPATIENT)
Dept: PHYSICAL THERAPY | Facility: REHABILITATION | Age: 53
End: 2018-06-27
Payer: COMMERCIAL

## 2018-06-27 DIAGNOSIS — M25.512 ACUTE PAIN OF LEFT SHOULDER: Primary | ICD-10-CM

## 2018-06-27 PROCEDURE — 97112 NEUROMUSCULAR REEDUCATION: CPT | Performed by: PHYSICAL THERAPIST

## 2018-06-27 PROCEDURE — 97110 THERAPEUTIC EXERCISES: CPT | Performed by: PHYSICAL THERAPIST

## 2018-06-27 NOTE — PROGRESS NOTES
Daily Note     Today's date: 2018  Patient name: Dejuan Castillo  : 1965  MRN: [id number]  Referring provider: Minerva Maxwell MD  Dx:   Encounter Diagnosis     ICD-10-CM    1  Acute pain of left shoulder M25 512        Start Time: 1030  Stop Time: 1116  Total time in clinic (min): 46 minutes    Subjective: My shoulder is feeling pretty well but my right knee is killing me  Objective: See treatment diary below      Assessment: Tolerated treatment well  Patient demonstrated fatigue post treatment, exhibited good technique with therapeutic exercises and would benefit from continued PT      Plan: Continue per plan of care  Potential discharge next visit      PRECAUTIONS STD   Daily Treatment Diary     Manual  5/8 5/15 5/18 5/22 5/24 6/4 6/7 6/12 6/19 6/26   LUE PROM LB RK LB LB CA CA RK LB CA LB   STM upper mid trap,scalenes LB RK LB LB LB CA RK LB     scap mobs grade2     LB         AC jt mob grade 1&2    LB CA CA   CA LB                    Exercise Diary  5/8 5/15 5/18 5/22 5/24 6/4 6/7 6/12 6/19 6/26   pulleys 5 min 5' 5' 6' UBE 8 min UBE 8 min UBE  8' UBE  8' UBE    8' ube 6'   Wallslides Flex/abd/ 2x10 2x10  Add er  2x10  wallwalks abd 10x    10x     scap squeezes   Ext, lawnmowers 3x15 3" 2x15 3x15 OTB 3x15  GTB 2x15  GTB  addedlawnmowers 2x15 GTB 2x15  GTB  2x20 GTB 3x20 BTB   AROM  Flex, ABD, ext,  x10 x10  10x  5" 10x10" 2x10 1#   T ball T's Y's 3x10    Prone rows     NV 2x15 #2 1x15 #2 1x15 #3 2x15  3# 2x15 4# 1x15 #4      1x15 #5                              T band   IR, ER, & EXT     2x15 PTB, GTB 2x15 PTB ER 2x15 ER/ EXT GTB 2x15 3x10 OTB 2x15 OTB 3x20  GTB  W's OTB 3x15     blackburns 1,2,3,4  2# on 1        2x20 2x20  #1 on 2 & 3    Body blade         Flex abd 3x40" Flex abd  IR/ER 3x30" 1 3#,2,3,4  2x20   Wall ball           3x30 c/cc                                                                                                                            Modalities

## 2018-07-03 ENCOUNTER — OFFICE VISIT (OUTPATIENT)
Dept: PHYSICAL THERAPY | Facility: REHABILITATION | Age: 53
End: 2018-07-03
Payer: COMMERCIAL

## 2018-07-03 DIAGNOSIS — M25.512 ACUTE PAIN OF LEFT SHOULDER: Primary | ICD-10-CM

## 2018-07-03 PROCEDURE — G8986 CARRY D/C STATUS: HCPCS | Performed by: PHYSICAL THERAPIST

## 2018-07-03 PROCEDURE — 97110 THERAPEUTIC EXERCISES: CPT | Performed by: PHYSICAL THERAPIST

## 2018-07-03 PROCEDURE — G8985 CARRY GOAL STATUS: HCPCS | Performed by: PHYSICAL THERAPIST

## 2018-07-03 PROCEDURE — 97140 MANUAL THERAPY 1/> REGIONS: CPT | Performed by: PHYSICAL THERAPIST

## 2018-07-03 PROCEDURE — 97112 NEUROMUSCULAR REEDUCATION: CPT | Performed by: PHYSICAL THERAPIST

## 2018-07-03 NOTE — PROGRESS NOTES
PT Discharge    Today's date: 7/3/2018  Patient name: Fouzia Wall  : 1965  MRN: [id number]  Referring provider: Yadi Franklin MD  Dx:   Encounter Diagnosis     ICD-10-CM    1  Acute pain of left shoulder M25 512        Start Time: 1100  Stop Time: 1156  Total time in clinic (min): 56 minutes    Assessment    Assessment details: Pt has been attending outpatient PT for _11___  Pt has made steady progress in PT and has met all impairment and functional goals at this time  Pt is independent with HEP  Pt is D/C from PT to HEP continue to progress towards personal goals  Thank you! Goals  Goals   ALL ACHIEVED   Short Term:  Pt will report decreased levels of pain by at least 2 subjective ratings in 4 weeks  Pt will demonstrate improved ROM by at least 10 degrees in 4 weeks  Pt will improve stength by 1/2 grade MMT  Long Term:   Pt will be independent in their HEP in 8 weeks  Pt will demonstrate improved FOTO, > 67  Pt will return to maximal functional with all overhead activities  Pt will perform all job duties without pain or restriction  Subjective Evaluation    Pain  Current pain ratin  At best pain ratin  At worst pain ratin          Objective     Static Posture     Shoulders  Rounded      Cervical/Thoracic Screen   Cervical range of motion within normal limits    Neurological Testing     Sensation     Shoulder   Left Shoulder   Intact: light touch and proprioception    Right Shoulder   Intact: light touch and proprioception    Reflexes   Left   Biceps (C5/C6): normal (2+)  Brachioradialis (C6): normal (2+)    Right   Biceps (C5/C6): normal (2+)  Brachioradialis (C6): normal (2+)    Active Range of Motion   Left Shoulder   Flexion: 176 degrees   Abduction: 162 degrees   External rotation 90°: 90 degrees   Internal rotation 45°: 72 degrees     Right Shoulder   Normal active range of motion    Passive Range of Motion   Left Shoulder   Normal passive range of motion  Flexion: 92 degrees   Abduction: 96 degrees   External rotation 45°: 51 degrees   Internal rotation 45°: 73 degrees     Right Shoulder   Normal passive range of motion    Scapular Mobility   Left Shoulder   Scapular mobility: WFL    Right Shoulder   Scapular mobility: WFL    Strength/Myotome Testing     Left Shoulder     Planes of Motion   Flexion: 4+   Extension: 5   Abduction: 4+   Adduction: 5   External rotation at 45°: 4+   Internal rotation at 45°: 5     Right Shoulder   Normal muscle strength    Left Elbow   Normal strength    Right Elbow   Normal strength    Tests     Left Shoulder   Negative drop arm and empty can         Flowsheet Rows      Most Recent Value   PT/OT G-Codes   Current Score  100   Projected Score  67   FOTO information reviewed  Yes   Assessment Type  Discharge   G code set  Carrying, Moving & Handling Objects   Carrying, Moving and Handling Objects Goal Status ()  [facility]   Carrying, Moving and Handling Objects Discharge Status ()  CJ        PRECAUTIONS STD   Daily Treatment Diary     Manual  7/03            LUE PROM LB             STM upper mid trap,scalenes LB            scap mobs grade2              AC jt mob grade 1&2 LB                             Exercise Diary              pulleys             Wallslides Flex/abd/             scap squeezes   Ext, lawnmowers BTB 3x15            AROM  Flex, ABD, ext, 2# 20            Prone rows                                        T band   IR, ER, & EXT GTB 3x15            blackburns 1,2,3,4  2# on 1 2x20            Body blade              Wall ball  3x 45 "                                                                                                                                     Modalities

## 2018-07-05 ENCOUNTER — HOSPITAL ENCOUNTER (OUTPATIENT)
Dept: RADIOLOGY | Facility: HOSPITAL | Age: 53
Discharge: HOME | End: 2018-07-05
Payer: COMMERCIAL

## 2018-07-05 DIAGNOSIS — S83.242A ACUTE MEDIAL MENISCUS TEAR OF LEFT KNEE, INITIAL ENCOUNTER: ICD-10-CM

## 2018-07-05 PROCEDURE — 73721 MRI JNT OF LWR EXTRE W/O DYE: CPT

## 2020-12-11 ENCOUNTER — APPOINTMENT (EMERGENCY)
Dept: RADIOLOGY | Facility: HOSPITAL | Age: 55
End: 2020-12-11
Payer: COMMERCIAL

## 2020-12-11 ENCOUNTER — HOSPITAL ENCOUNTER (EMERGENCY)
Facility: HOSPITAL | Age: 55
Discharge: HOME | End: 2020-12-11
Attending: EMERGENCY MEDICINE | Admitting: EMERGENCY MEDICINE
Payer: COMMERCIAL

## 2020-12-11 ENCOUNTER — APPOINTMENT (EMERGENCY)
Dept: NON INVASIVE DIAGNOSTICS | Facility: HOSPITAL | Age: 55
End: 2020-12-11
Payer: COMMERCIAL

## 2020-12-11 VITALS
TEMPERATURE: 98 F | RESPIRATION RATE: 18 BRPM | SYSTOLIC BLOOD PRESSURE: 132 MMHG | OXYGEN SATURATION: 99 % | DIASTOLIC BLOOD PRESSURE: 62 MMHG | HEART RATE: 58 BPM

## 2020-12-11 DIAGNOSIS — R07.9 CHEST PAIN: Primary | ICD-10-CM

## 2020-12-11 DIAGNOSIS — R52 PAIN: ICD-10-CM

## 2020-12-11 LAB
ALBUMIN SERPL BCP-MCNC: 4.2 G/DL (ref 3.5–5)
ALP SERPL-CCNC: 85 U/L (ref 46–116)
ALT SERPL W P-5'-P-CCNC: 28 U/L (ref 12–78)
ANION GAP SERPL CALCULATED.3IONS-SCNC: 8 MMOL/L (ref 4–13)
AST SERPL W P-5'-P-CCNC: 25 U/L (ref 5–45)
ATRIAL RATE: 50 BPM
BASOPHILS # BLD AUTO: 0.02 THOUSANDS/ΜL (ref 0–0.1)
BASOPHILS NFR BLD AUTO: 1 % (ref 0–1)
BILIRUB SERPL-MCNC: 0.86 MG/DL (ref 0.2–1)
BUN SERPL-MCNC: 13 MG/DL (ref 5–25)
CALCIUM SERPL-MCNC: 10.1 MG/DL (ref 8.3–10.1)
CHLORIDE SERPL-SCNC: 113 MMOL/L (ref 100–108)
CO2 SERPL-SCNC: 21 MMOL/L (ref 21–32)
CREAT SERPL-MCNC: 0.85 MG/DL (ref 0.6–1.3)
EOSINOPHIL # BLD AUTO: 0.06 THOUSAND/ΜL (ref 0–0.61)
EOSINOPHIL NFR BLD AUTO: 2 % (ref 0–6)
ERYTHROCYTE [DISTWIDTH] IN BLOOD BY AUTOMATED COUNT: 12.7 % (ref 11.6–15.1)
GFR SERPL CREATININE-BSD FRML MDRD: 78 ML/MIN/1.73SQ M
GLUCOSE SERPL-MCNC: 94 MG/DL (ref 65–140)
HCT VFR BLD AUTO: 42 % (ref 34.8–46.1)
HGB BLD-MCNC: 14.4 G/DL (ref 11.5–15.4)
IMM GRANULOCYTES # BLD AUTO: 0.01 THOUSAND/UL (ref 0–0.2)
IMM GRANULOCYTES NFR BLD AUTO: 0 % (ref 0–2)
LYMPHOCYTES # BLD AUTO: 0.79 THOUSANDS/ΜL (ref 0.6–4.47)
LYMPHOCYTES NFR BLD AUTO: 23 % (ref 14–44)
MCH RBC QN AUTO: 29.4 PG (ref 26.8–34.3)
MCHC RBC AUTO-ENTMCNC: 34.3 G/DL (ref 31.4–37.4)
MCV RBC AUTO: 86 FL (ref 82–98)
MONOCYTES # BLD AUTO: 0.57 THOUSAND/ΜL (ref 0.17–1.22)
MONOCYTES NFR BLD AUTO: 17 % (ref 4–12)
NEUTROPHILS # BLD AUTO: 1.99 THOUSANDS/ΜL (ref 1.85–7.62)
NEUTS SEG NFR BLD AUTO: 57 % (ref 43–75)
NRBC BLD AUTO-RTO: 0 /100 WBCS
P AXIS: 75 DEGREES
PLATELET # BLD AUTO: 203 THOUSANDS/UL (ref 149–390)
PMV BLD AUTO: 9.8 FL (ref 8.9–12.7)
POTASSIUM SERPL-SCNC: 3.6 MMOL/L (ref 3.5–5.3)
PR INTERVAL: 162 MS
PROT SERPL-MCNC: 7.2 G/DL (ref 6.4–8.2)
QRS AXIS: 54 DEGREES
QRSD INTERVAL: 92 MS
QT INTERVAL: 466 MS
QTC INTERVAL: 424 MS
RBC # BLD AUTO: 4.89 MILLION/UL (ref 3.81–5.12)
SODIUM SERPL-SCNC: 142 MMOL/L (ref 136–145)
T WAVE AXIS: 59 DEGREES
TROPONIN I SERPL-MCNC: <0.02 NG/ML
TROPONIN I SERPL-MCNC: <0.02 NG/ML
TSH SERPL DL<=0.05 MIU/L-ACNC: 0.91 UIU/ML (ref 0.36–3.74)
VENTRICULAR RATE: 50 BPM
WBC # BLD AUTO: 3.44 THOUSAND/UL (ref 4.31–10.16)

## 2020-12-11 PROCEDURE — 84484 ASSAY OF TROPONIN QUANT: CPT | Performed by: EMERGENCY MEDICINE

## 2020-12-11 PROCEDURE — 85025 COMPLETE CBC W/AUTO DIFF WBC: CPT | Performed by: EMERGENCY MEDICINE

## 2020-12-11 PROCEDURE — 71045 X-RAY EXAM CHEST 1 VIEW: CPT

## 2020-12-11 PROCEDURE — 99285 EMERGENCY DEPT VISIT HI MDM: CPT | Performed by: EMERGENCY MEDICINE

## 2020-12-11 PROCEDURE — 36415 COLL VENOUS BLD VENIPUNCTURE: CPT

## 2020-12-11 PROCEDURE — 71275 CT ANGIOGRAPHY CHEST: CPT

## 2020-12-11 PROCEDURE — 99285 EMERGENCY DEPT VISIT HI MDM: CPT

## 2020-12-11 PROCEDURE — 93010 ELECTROCARDIOGRAM REPORT: CPT | Performed by: INTERNAL MEDICINE

## 2020-12-11 PROCEDURE — 76882 US LMTD JT/FCL EVL NVASC XTR: CPT | Performed by: EMERGENCY MEDICINE

## 2020-12-11 PROCEDURE — 93971 EXTREMITY STUDY: CPT | Performed by: SURGERY

## 2020-12-11 PROCEDURE — 80053 COMPREHEN METABOLIC PANEL: CPT | Performed by: EMERGENCY MEDICINE

## 2020-12-11 PROCEDURE — 93308 TTE F-UP OR LMTD: CPT | Performed by: EMERGENCY MEDICINE

## 2020-12-11 PROCEDURE — 84443 ASSAY THYROID STIM HORMONE: CPT | Performed by: EMERGENCY MEDICINE

## 2020-12-11 PROCEDURE — 93971 EXTREMITY STUDY: CPT

## 2020-12-11 PROCEDURE — G1004 CDSM NDSC: HCPCS

## 2020-12-11 PROCEDURE — 93005 ELECTROCARDIOGRAM TRACING: CPT

## 2020-12-11 RX ORDER — ACETAMINOPHEN 325 MG/1
975 TABLET ORAL ONCE
Status: COMPLETED | OUTPATIENT
Start: 2020-12-11 | End: 2020-12-11

## 2020-12-11 RX ADMIN — IOHEXOL 85 ML: 350 INJECTION, SOLUTION INTRAVENOUS at 11:31

## 2020-12-11 RX ADMIN — ACETAMINOPHEN 975 MG: 325 TABLET, FILM COATED ORAL at 10:15

## 2020-12-18 ENCOUNTER — TRANSCRIBE ORDERS (OUTPATIENT)
Dept: LAB | Facility: HOSPITAL | Age: 55
End: 2020-12-18

## 2020-12-18 ENCOUNTER — APPOINTMENT (OUTPATIENT)
Dept: LAB | Facility: HOSPITAL | Age: 55
End: 2020-12-18
Payer: COMMERCIAL

## 2020-12-18 DIAGNOSIS — U07.1 LAB TEST POSITIVE FOR DETECTION OF COVID-19 VIRUS: Primary | ICD-10-CM

## 2020-12-18 LAB
ALBUMIN SERPL BCP-MCNC: 4.1 G/DL (ref 3.5–5)
ALP SERPL-CCNC: 95 U/L (ref 46–116)
ALT SERPL W P-5'-P-CCNC: 41 U/L (ref 12–78)
ANION GAP SERPL CALCULATED.3IONS-SCNC: 6 MMOL/L (ref 4–13)
AST SERPL W P-5'-P-CCNC: 15 U/L (ref 5–45)
BASOPHILS # BLD AUTO: 0.02 THOUSANDS/ΜL (ref 0–0.1)
BASOPHILS NFR BLD AUTO: 0 % (ref 0–1)
BILIRUB SERPL-MCNC: 0.79 MG/DL (ref 0.2–1)
BUN SERPL-MCNC: 19 MG/DL (ref 5–25)
CALCIUM SERPL-MCNC: 10.1 MG/DL (ref 8.3–10.1)
CHLORIDE SERPL-SCNC: 109 MMOL/L (ref 100–108)
CO2 SERPL-SCNC: 28 MMOL/L (ref 21–32)
CREAT SERPL-MCNC: 0.89 MG/DL (ref 0.6–1.3)
CRP SERPL HS-MCNC: <0.9 MG/L
EOSINOPHIL # BLD AUTO: 0 THOUSAND/ΜL (ref 0–0.61)
EOSINOPHIL NFR BLD AUTO: 0 % (ref 0–6)
ERYTHROCYTE [DISTWIDTH] IN BLOOD BY AUTOMATED COUNT: 12.8 % (ref 11.6–15.1)
FERRITIN SERPL-MCNC: 196 NG/ML (ref 8–388)
GFR SERPL CREATININE-BSD FRML MDRD: 74 ML/MIN/1.73SQ M
GLUCOSE SERPL-MCNC: 70 MG/DL (ref 65–140)
HCT VFR BLD AUTO: 45.5 % (ref 34.8–46.1)
HGB BLD-MCNC: 14.8 G/DL (ref 11.5–15.4)
IMM GRANULOCYTES # BLD AUTO: 0.03 THOUSAND/UL (ref 0–0.2)
IMM GRANULOCYTES NFR BLD AUTO: 0 % (ref 0–2)
LYMPHOCYTES # BLD AUTO: 1.95 THOUSANDS/ΜL (ref 0.6–4.47)
LYMPHOCYTES NFR BLD AUTO: 29 % (ref 14–44)
MCH RBC QN AUTO: 29.1 PG (ref 26.8–34.3)
MCHC RBC AUTO-ENTMCNC: 32.5 G/DL (ref 31.4–37.4)
MCV RBC AUTO: 90 FL (ref 82–98)
MONOCYTES # BLD AUTO: 0.66 THOUSAND/ΜL (ref 0.17–1.22)
MONOCYTES NFR BLD AUTO: 10 % (ref 4–12)
NEUTROPHILS # BLD AUTO: 4.12 THOUSANDS/ΜL (ref 1.85–7.62)
NEUTS SEG NFR BLD AUTO: 61 % (ref 43–75)
NRBC BLD AUTO-RTO: 0 /100 WBCS
PLATELET # BLD AUTO: 169 THOUSANDS/UL (ref 149–390)
PMV BLD AUTO: 10.8 FL (ref 8.9–12.7)
POTASSIUM SERPL-SCNC: 3.5 MMOL/L (ref 3.5–5.3)
PROT SERPL-MCNC: 7.5 G/DL (ref 6.4–8.2)
RBC # BLD AUTO: 5.08 MILLION/UL (ref 3.81–5.12)
SODIUM SERPL-SCNC: 143 MMOL/L (ref 136–145)
WBC # BLD AUTO: 6.78 THOUSAND/UL (ref 4.31–10.16)

## 2020-12-18 PROCEDURE — 82728 ASSAY OF FERRITIN: CPT

## 2020-12-18 PROCEDURE — 80053 COMPREHEN METABOLIC PANEL: CPT

## 2020-12-18 PROCEDURE — 85025 COMPLETE CBC W/AUTO DIFF WBC: CPT

## 2020-12-18 PROCEDURE — 36415 COLL VENOUS BLD VENIPUNCTURE: CPT

## 2020-12-18 PROCEDURE — 86141 C-REACTIVE PROTEIN HS: CPT

## 2021-02-13 ENCOUNTER — OFFICE VISIT (OUTPATIENT)
Dept: URGENT CARE | Facility: CLINIC | Age: 56
End: 2021-02-13
Payer: COMMERCIAL

## 2021-02-13 VITALS
RESPIRATION RATE: 18 BRPM | HEART RATE: 52 BPM | HEIGHT: 70 IN | OXYGEN SATURATION: 97 % | BODY MASS INDEX: 29.46 KG/M2 | DIASTOLIC BLOOD PRESSURE: 60 MMHG | TEMPERATURE: 98.9 F | WEIGHT: 205.8 LBS | SYSTOLIC BLOOD PRESSURE: 123 MMHG

## 2021-02-13 DIAGNOSIS — M62.838 TRAPEZIUS MUSCLE SPASM: Primary | ICD-10-CM

## 2021-02-13 PROCEDURE — G0382 LEV 3 HOSP TYPE B ED VISIT: HCPCS | Performed by: PREVENTIVE MEDICINE

## 2021-02-13 PROCEDURE — S9083 URGENT CARE CENTER GLOBAL: HCPCS | Performed by: PREVENTIVE MEDICINE

## 2021-02-13 NOTE — PATIENT INSTRUCTIONS
Ice to the area for 5 times a day for the next 2 days  Ibuprofen 600 mg 3 times a day  Get some Voltaren 1% gel and apply twice a day    If no improvement you might consider outpatient physi

## 2021-02-13 NOTE — PROGRESS NOTES
[facility] [facility]        NAME: Deysi Morris is a 50 y o  female  : 1965    MRN: [id number]  DATE: 2021  TIME: 1:50 PM    Assessment and Plan   Trapezius muscle spasm [M62 838]  1  Trapezius muscle spasm           Patient Instructions       Follow up with PCP in 3-5 days  Proceed to  ER if symptoms worsen  Chief Complaint     Chief Complaint   Patient presents with    Neck Pain     Few hours ago started with shooting pain in neck that radiates to both shoulders  Denies injury  History of Present Illness        Sudden onset of neck and superior shoulder pain  She has trouble turning her head to the left  No radiation into the arms  No numbness or tingling in the arms  No weakness into       Review of Systems   Review of Systems   Musculoskeletal: Positive for myalgias and neck pain  Current Medications       Current Outpatient Medications:     Calcium Carbonate (CALCIUM 600 PO), Take by mouth daily  2 tabs, Disp: , Rfl:     Calcium Carbonate-Vit D-Min (CALCIUM 1200) 2618-6515 MG-UNIT CHEW, Chew, Disp: , Rfl:     omeprazole (PriLOSEC) 40 MG capsule, Take 40 mg by mouth 2 (two) times a day , Disp: , Rfl:     solifenacin (VESICARE) 5 mg tablet, Take 10 mg by mouth daily  , Disp: , Rfl:     Current Allergies     Allergies as of 2021    (No Known Allergies)            The following portions of the patient's history were reviewed and updated as appropriate: allergies, current medications, past family history, past medical history, past social history, past surgical history and problem list      Past Medical History:   Diagnosis Date    Acid reflux     Arrhythmia     supraventricular    Bradyarrhythmia     Cholecystitis     De Quervain's disease (tenosynovitis)     Osteoarthritis     PONV (postoperative nausea and vomiting)     Urinary frequency     Wears glasses        Past Surgical History:   Procedure Laterality Date    ATRIAL ABLATION SURGERY av cath ablation an node    BUNIONECTOMY Bilateral     CERVICAL DISC SURGERY      CHOLECYSTECTOMY      LUMBAR FUSION      ID ANKLE SCOPE,PLANTAR FASCIOTOMY Right 8/30/2016    Procedure: RELEASE FASCIA PLANTAR/FASCIOTOMY ENDOSCOPIC (EPF); Surgeon: Alisha Worley DPM;  Location: [facility];  Service: Podiatry    WISDOM TOOTH EXTRACTION      WRIST SURGERY Right        Family History   Problem Relation Age of Onset    COPD Mother     Coronary artery disease Father          Medications have been verified  Objective   /60   Pulse (!) 52   Temp 98 9 °F (37 2 °C) (Tympanic)   Resp 18   Ht 5' 10" (1 778 m)   Wt 93 4 kg (205 lb 12 8 oz)   SpO2 97%   BMI 29 53 kg/m²   No LMP recorded  (Menstrual status: Birth Control)  Physical Exam     Physical Exam  Musculoskeletal:      Comments: Markedly tender in spastic bilateral superior trapezius muscles  There is a trigger point that is elicited in the superior aspect of the left trapezius  Difficulty turning her head to the left

## 2021-03-11 ENCOUNTER — OFFICE VISIT (OUTPATIENT)
Dept: OBGYN CLINIC | Facility: HOSPITAL | Age: 56
End: 2021-03-11
Payer: COMMERCIAL

## 2021-03-11 ENCOUNTER — HOSPITAL ENCOUNTER (OUTPATIENT)
Dept: RADIOLOGY | Facility: HOSPITAL | Age: 56
Discharge: HOME | End: 2021-03-11
Attending: ORTHOPAEDIC SURGERY
Payer: COMMERCIAL

## 2021-03-11 VITALS
HEART RATE: 62 BPM | DIASTOLIC BLOOD PRESSURE: 76 MMHG | SYSTOLIC BLOOD PRESSURE: 120 MMHG | BODY MASS INDEX: 29.53 KG/M2 | HEIGHT: 70 IN

## 2021-03-11 DIAGNOSIS — M76.52 PATELLAR TENDINITIS OF LEFT KNEE: ICD-10-CM

## 2021-03-11 DIAGNOSIS — M25.562 LEFT KNEE PAIN, UNSPECIFIED CHRONICITY: ICD-10-CM

## 2021-03-11 DIAGNOSIS — M22.2X2 PATELLOFEMORAL SYNDROME OF LEFT KNEE: Primary | ICD-10-CM

## 2021-03-11 PROCEDURE — 99203 OFFICE O/P NEW LOW 30 MIN: CPT | Performed by: ORTHOPAEDIC SURGERY

## 2021-03-11 PROCEDURE — 73562 X-RAY EXAM OF KNEE 3: CPT

## 2021-03-11 PROCEDURE — 20610 DRAIN/INJ JOINT/BURSA W/O US: CPT | Performed by: ORTHOPAEDIC SURGERY

## 2021-03-11 PROCEDURE — 1036F TOBACCO NON-USER: CPT | Performed by: ORTHOPAEDIC SURGERY

## 2021-03-11 RX ORDER — VORTIOXETINE 5 MG/1
5 TABLET, FILM COATED ORAL
COMMUNITY
Start: 2021-02-06

## 2021-03-11 RX ORDER — OXYBUTYNIN CHLORIDE 5 MG/1
5 TABLET ORAL 2 TIMES DAILY
COMMUNITY
Start: 2021-02-05

## 2021-03-11 RX ORDER — DEXAMETHASONE 6 MG/1
6 TABLET ORAL DAILY
COMMUNITY
Start: 2020-12-14 | End: 2022-01-04

## 2021-03-11 RX ORDER — BUPIVACAINE HYDROCHLORIDE 2.5 MG/ML
2 INJECTION, SOLUTION INFILTRATION; PERINEURAL
Status: COMPLETED | OUTPATIENT
Start: 2021-03-11 | End: 2021-03-11

## 2021-03-11 RX ORDER — PREDNISONE 10 MG/1
TABLET ORAL
COMMUNITY
Start: 2020-12-21 | End: 2022-01-04

## 2021-03-11 RX ORDER — BETAMETHASONE SODIUM PHOSPHATE AND BETAMETHASONE ACETATE 3; 3 MG/ML; MG/ML
6 INJECTION, SUSPENSION INTRA-ARTICULAR; INTRALESIONAL; INTRAMUSCULAR; SOFT TISSUE
Status: COMPLETED | OUTPATIENT
Start: 2021-03-11 | End: 2021-03-11

## 2021-03-11 RX ORDER — ESCITALOPRAM OXALATE 5 MG/1
5 TABLET ORAL DAILY
COMMUNITY
Start: 2020-12-14 | End: 2022-01-04

## 2021-03-11 RX ORDER — LIDOCAINE HYDROCHLORIDE 10 MG/ML
2 INJECTION, SOLUTION INFILTRATION; PERINEURAL
Status: COMPLETED | OUTPATIENT
Start: 2021-03-11 | End: 2021-03-11

## 2021-03-11 RX ADMIN — LIDOCAINE HYDROCHLORIDE 2 ML: 10 INJECTION, SOLUTION INFILTRATION; PERINEURAL at 14:27

## 2021-03-11 RX ADMIN — BUPIVACAINE HYDROCHLORIDE 2 ML: 2.5 INJECTION, SOLUTION INFILTRATION; PERINEURAL at 14:27

## 2021-03-11 RX ADMIN — BETAMETHASONE SODIUM PHOSPHATE AND BETAMETHASONE ACETATE 6 MG: 3; 3 INJECTION, SUSPENSION INTRA-ARTICULAR; INTRALESIONAL; INTRAMUSCULAR; SOFT TISSUE at 14:27

## 2021-03-11 NOTE — PROGRESS NOTES
Assessment  Diagnoses and all orders for this visit:    Patellofemoral syndrome of left knee    Patellar tendinitis of left knee    Discussion and Plan:    WBAT LLE  Steroid injection provided to left knee for pain relief purposes  Rest and ice, monitor symptoms  Follow up in an as needed basis    Subjective:   Patient ID: Sarah Gaitan is a 49 y o  female      53-year-old female presents for evaluation of left knee pain  Pain started around 3 weeks ago when she was kneeling  She localizes the pain to the anterior aspect of her knee on top of her kneecap and her patellar tendon  She denies radiation of the pain  She denies pain in her joint line  She denies falls or traumatic injuries to this knee  She denies recent injections or physical therapy to the left knee  She has tried Motrin with some relief  Pain is worse with sitting in a flexed position, walking for long periods of time, and with stairs  The following portions of the patient's history were reviewed and updated as appropriate: allergies, current medications, past family history, past medical history, past social history, past surgical history and problem list     Review of Systems   Constitutional: Negative for appetite change and fever  HENT: Negative for sore throat  Eyes: Negative for visual disturbance  Respiratory: Negative for shortness of breath  Cardiovascular: Negative for chest pain  Gastrointestinal: Negative for nausea and vomiting  Musculoskeletal: Positive for arthralgias and myalgias  Skin: Negative for wound  Objective:  Ht 5' 10" (1 778 m)   BMI 29 53 kg/m²       Right Knee Exam     Muscle Strength   The patient has normal right knee strength  Tenderness   The patient is experiencing tenderness in the patellar tendon and patella      Range of Motion   Extension: 0   Flexion: 130     Tests   Varus: negative Valgus: negative    Other   Scars: absent  Sensation: normal    Comments:  No warmth, no palpable gap  Extensor mechanism is intact  No effusion  TTP over patella and patellar tendon, mild TTP over medial joint line              Physical Exam  Vitals signs and nursing note reviewed  Constitutional:       Appearance: Normal appearance  HENT:      Head: Normocephalic and atraumatic  Nose: Nose normal       Mouth/Throat:      Mouth: Mucous membranes are moist    Eyes:      Extraocular Movements: Extraocular movements intact  Conjunctiva/sclera: Conjunctivae normal    Neck:      Musculoskeletal: Normal range of motion  Cardiovascular:      Rate and Rhythm: Normal rate  Pulses: Normal pulses  Pulmonary:      Effort: Pulmonary effort is normal       Breath sounds: Normal breath sounds  Abdominal:      Palpations: Abdomen is soft  Musculoskeletal:      Comments: See Ortho Exam   Skin:     General: Skin is warm  Capillary Refill: Capillary refill takes less than 2 seconds  Neurological:      General: No focal deficit present  Mental Status: She is alert  Mental status is at baseline  Psychiatric:         Mood and Affect: Mood normal          Behavior: Behavior normal        Large joint arthrocentesis: L knee  Universal Protocol:  Consent: Verbal consent obtained  Consent given by: patient  Time out: Immediately prior to procedure a "time out" was called to verify the correct patient, procedure, equipment, support staff and site/side marked as required    Timeout called at: 3/11/2021 2:15 PM   Patient understanding: patient states understanding of the procedure being performed    Supporting Documentation  Indications: pain   Procedure Details  Location: knee - L knee  Needle size: 22 G  Ultrasound guidance: no  Approach: anterolateral  Medications administered: 6 mg betamethasone acetate-betamethasone sodium phosphate 6 (3-3) mg/mL; 2 mL bupivacaine 0 25 %; 2 mL lidocaine 1 %    Patient tolerance: patient tolerated the procedure well with no immediate complications  Dressing:  Sterile dressing applied          I have personally reviewed pertinent films in PACS and my interpretation is as follows      Xrays Left knee 3 views are largely unremarkable, no evidence of advanced degenerative disease, no evidence of entesophytes or fractures or dislocaion

## 2021-03-30 DIAGNOSIS — Z23 ENCOUNTER FOR IMMUNIZATION: ICD-10-CM

## 2021-04-01 ENCOUNTER — IMMUNIZATIONS (OUTPATIENT)
Dept: FAMILY MEDICINE CLINIC | Facility: HOSPITAL | Age: 56
End: 2021-04-01

## 2021-04-01 DIAGNOSIS — Z23 ENCOUNTER FOR IMMUNIZATION: Primary | ICD-10-CM

## 2021-04-01 PROCEDURE — 91300 SARS-COV-2 / COVID-19 MRNA VACCINE (PFIZER-BIONTECH) 30 MCG: CPT

## 2021-04-01 PROCEDURE — 0001A SARS-COV-2 / COVID-19 MRNA VACCINE (PFIZER-BIONTECH) 30 MCG: CPT

## 2021-04-24 ENCOUNTER — IMMUNIZATIONS (OUTPATIENT)
Dept: FAMILY MEDICINE CLINIC | Facility: HOSPITAL | Age: 56
End: 2021-04-24

## 2021-04-24 DIAGNOSIS — Z23 ENCOUNTER FOR IMMUNIZATION: Primary | ICD-10-CM

## 2021-04-24 PROCEDURE — 91300 SARS-COV-2 / COVID-19 MRNA VACCINE (PFIZER-BIONTECH) 30 MCG: CPT

## 2021-04-24 PROCEDURE — 0002A SARS-COV-2 / COVID-19 MRNA VACCINE (PFIZER-BIONTECH) 30 MCG: CPT

## 2021-11-30 ENCOUNTER — APPOINTMENT (OUTPATIENT)
Dept: RADIOLOGY | Facility: CLINIC | Age: 56
End: 2021-11-30
Payer: COMMERCIAL

## 2021-11-30 ENCOUNTER — OFFICE VISIT (OUTPATIENT)
Dept: URGENT CARE | Facility: CLINIC | Age: 56
End: 2021-11-30
Payer: COMMERCIAL

## 2021-11-30 VITALS
BODY MASS INDEX: 29.35 KG/M2 | HEIGHT: 70 IN | RESPIRATION RATE: 16 BRPM | WEIGHT: 205 LBS | OXYGEN SATURATION: 99 % | HEART RATE: 65 BPM | TEMPERATURE: 98.2 F | SYSTOLIC BLOOD PRESSURE: 136 MMHG | DIASTOLIC BLOOD PRESSURE: 88 MMHG

## 2021-11-30 DIAGNOSIS — S69.91XA INJURY OF RIGHT THUMB, INITIAL ENCOUNTER: ICD-10-CM

## 2021-11-30 DIAGNOSIS — S69.91XA INJURY OF RIGHT THUMB, INITIAL ENCOUNTER: Primary | ICD-10-CM

## 2021-11-30 PROCEDURE — 73140 X-RAY EXAM OF FINGER(S): CPT

## 2021-11-30 PROCEDURE — S9083 URGENT CARE CENTER GLOBAL: HCPCS | Performed by: PHYSICIAN ASSISTANT

## 2021-11-30 PROCEDURE — G0382 LEV 3 HOSP TYPE B ED VISIT: HCPCS | Performed by: PHYSICIAN ASSISTANT

## 2021-12-01 ENCOUNTER — TELEPHONE (OUTPATIENT)
Dept: OBGYN CLINIC | Facility: CLINIC | Age: 56
End: 2021-12-01

## 2021-12-02 ENCOUNTER — OFFICE VISIT (OUTPATIENT)
Dept: OBGYN CLINIC | Facility: CLINIC | Age: 56
End: 2021-12-02
Payer: COMMERCIAL

## 2021-12-02 ENCOUNTER — OFFICE VISIT (OUTPATIENT)
Dept: OCCUPATIONAL THERAPY | Facility: CLINIC | Age: 56
End: 2021-12-02
Payer: COMMERCIAL

## 2021-12-02 VITALS
HEART RATE: 61 BPM | HEIGHT: 70 IN | WEIGHT: 205 LBS | SYSTOLIC BLOOD PRESSURE: 102 MMHG | BODY MASS INDEX: 29.35 KG/M2 | DIASTOLIC BLOOD PRESSURE: 71 MMHG

## 2021-12-02 DIAGNOSIS — S69.91XA INJURY OF RIGHT THUMB, INITIAL ENCOUNTER: ICD-10-CM

## 2021-12-02 DIAGNOSIS — S69.91XA INJURY OF RIGHT THUMB, INITIAL ENCOUNTER: Primary | ICD-10-CM

## 2021-12-02 PROCEDURE — L3933 FO W/O JOINTS CF: HCPCS

## 2021-12-02 PROCEDURE — 99213 OFFICE O/P EST LOW 20 MIN: CPT | Performed by: ORTHOPAEDIC SURGERY

## 2021-12-20 ENCOUNTER — APPOINTMENT (OUTPATIENT)
Dept: RADIOLOGY | Facility: CLINIC | Age: 56
End: 2021-12-20
Payer: COMMERCIAL

## 2021-12-20 ENCOUNTER — OFFICE VISIT (OUTPATIENT)
Dept: OBGYN CLINIC | Facility: CLINIC | Age: 56
End: 2021-12-20
Payer: COMMERCIAL

## 2021-12-20 VITALS
SYSTOLIC BLOOD PRESSURE: 132 MMHG | WEIGHT: 205 LBS | DIASTOLIC BLOOD PRESSURE: 78 MMHG | HEIGHT: 70 IN | HEART RATE: 82 BPM | BODY MASS INDEX: 29.35 KG/M2

## 2021-12-20 DIAGNOSIS — S69.91XA INJURY OF RIGHT THUMB, INITIAL ENCOUNTER: ICD-10-CM

## 2021-12-20 DIAGNOSIS — S69.91XD INJURY OF RIGHT THUMB, SUBSEQUENT ENCOUNTER: Primary | ICD-10-CM

## 2021-12-20 PROCEDURE — 73140 X-RAY EXAM OF FINGER(S): CPT

## 2021-12-20 PROCEDURE — 99213 OFFICE O/P EST LOW 20 MIN: CPT | Performed by: ORTHOPAEDIC SURGERY

## 2022-01-04 ENCOUNTER — APPOINTMENT (OUTPATIENT)
Dept: RADIOLOGY | Facility: CLINIC | Age: 57
End: 2022-01-04
Payer: COMMERCIAL

## 2022-01-04 ENCOUNTER — OFFICE VISIT (OUTPATIENT)
Dept: OBGYN CLINIC | Facility: CLINIC | Age: 57
End: 2022-01-04
Payer: COMMERCIAL

## 2022-01-04 VITALS
HEIGHT: 70 IN | HEART RATE: 60 BPM | BODY MASS INDEX: 30.35 KG/M2 | WEIGHT: 212 LBS | DIASTOLIC BLOOD PRESSURE: 64 MMHG | SYSTOLIC BLOOD PRESSURE: 100 MMHG

## 2022-01-04 DIAGNOSIS — S69.91XD INJURY OF RIGHT THUMB, SUBSEQUENT ENCOUNTER: ICD-10-CM

## 2022-01-04 DIAGNOSIS — S69.91XD INJURY OF RIGHT THUMB, SUBSEQUENT ENCOUNTER: Primary | ICD-10-CM

## 2022-01-04 PROCEDURE — 99213 OFFICE O/P EST LOW 20 MIN: CPT | Performed by: ORTHOPAEDIC SURGERY

## 2022-01-04 PROCEDURE — 73140 X-RAY EXAM OF FINGER(S): CPT

## 2022-01-04 NOTE — PROGRESS NOTES
Assessment/Plan:  1  Injury of right thumb, subsequent encounter  XR thumb right first digit-min 3     49year old female with right thumb probable distal phalanx base fracture, approximately 1 month out from injury  I discussed patient's xrays with her today and explained that everything appears to be healing well  At this time, patient may completely discontinue use of splint  Since she is moving so well, I do not think she needs formal therapy  She should still avoid heavy lifting for another couple weeks  Follow up in 3 weeks time for repeat evaluation and xrays  Subjective:   Reggie Dooley is a 49 y o  female who presents to the office today for follow up evaluation of right thumb injury sustained 11/30/21 with concern for possible distal phalanx base fracture  Patient advised to remove splint while at home at last visit and work on ROM  Patient states overall the finger is feeling well  She denies any pain  States it intermittently will feel stiff, but overall her motion has improved  No n/t  States at this time the thumb does not interfere with her ADLs  Patient states she ordered the B vitamins online, but they have not arrived yet  Review of Systems   Musculoskeletal:        As noted in HPI  All other systems reviewed and are negative          Past Medical History:   Diagnosis Date    Acid reflux     Arrhythmia     supraventricular    Bradyarrhythmia     Cholecystitis     De Quervain's disease (tenosynovitis)     Osteoarthritis     PONV (postoperative nausea and vomiting)     Urinary frequency     Wears glasses        Past Surgical History:   Procedure Laterality Date    ATRIAL ABLATION SURGERY      av cath ablation an node    BACK SURGERY      BUNIONECTOMY Bilateral     CERVICAL DISC SURGERY      CHOLECYSTECTOMY      HAND SURGERY      LUMBAR FUSION      NECK SURGERY      MA ANKLE SCOPE,PLANTAR FASCIOTOMY Right 8/30/2016    Procedure: RELEASE FASCIA PLANTAR/FASCIOTOMY ENDOSCOPIC (EPF); Surgeon: Trent Irene DPM;  Location: [facility];  Service: Podiatry    TENDON REPAIR      WISDOM TOOTH EXTRACTION      WRIST SURGERY Right        Family History   Problem Relation Age of Onset    COPD Mother     Osteoporosis Mother     Coronary artery disease Father     Cancer Maternal Uncle     Scoliosis Sister        Social History     Occupational History    Not on file   Tobacco Use    Smoking status: Former Smoker     Packs/day: 1 00     Years: 15 00     Pack years: 15 00     Types: Cigarettes     Quit date: 2007     Years since quittin 6    Smokeless tobacco: Never Used    Tobacco comment: quit 13 years ago   Substance and Sexual Activity    Alcohol use: Yes     Alcohol/week: 0 0 standard drinks     Comment: 1x monthly    Drug use: No    Sexual activity: Yes     Partners: Male     Birth control/protection: I U D  Current Outpatient Medications:     Calcium Carbonate-Vit D-Min (CALCIUM 1200) 5618-9075 MG-UNIT CHEW, Chew, Disp: , Rfl:     Dexilant 60 MG capsule, Take 1 capsule by mouth daily, Disp: , Rfl:     oxybutynin (DITROPAN) 5 mg tablet, Take 5 mg by mouth 2 (two) times a day, Disp: , Rfl:     Trintellix 5 MG tablet, TAKE 1 TABLET BY ORAL ROUTE EVERY DAY AT THE SAME TIME EACH DAY, Disp: , Rfl:     No Known Allergies    Objective:  Vitals:    22 1429   BP: 100/64   Pulse: 60       Ortho Exam   Right Thumb:  Small subungual hematoma, no lifting of the nail  FPL/EPL intact  Mild ttp volar IP joint  TTP along radial joint line, nontender ulnarly  Patient has well-maintained IP joint flexion/extension compared to contralateral side  She can oppose to small finger base  Thumb is warm and well perfused  Physical Exam  Vitals reviewed  Constitutional:       Appearance: Normal appearance  She is well-developed  HENT:      Head: Normocephalic and atraumatic  Eyes:      Extraocular Movements: Extraocular movements intact  Conjunctiva/sclera: Conjunctivae normal    Neck:      Trachea: No tracheal deviation  Cardiovascular:      Pulses: Normal pulses  Pulmonary:      Effort: Pulmonary effort is normal    Abdominal:      Tenderness: There is no guarding  Skin:     General: Skin is warm and dry  Neurological:      Mental Status: She is alert and oriented to person, place, and time  Psychiatric:         Behavior: Behavior normal          Thought Content: Thought content normal          Judgment: Judgment normal          Studies Reviewed:  I have personally reviewed pertinent films in PACS and my interpretation is as follows:  Xrays taken today of the right thumb demonstrate possible nondisplaced distal phalanx base fracture        Procedures Performed:  Procedures  No Procedures performed today

## 2022-02-01 ENCOUNTER — APPOINTMENT (OUTPATIENT)
Dept: RADIOLOGY | Facility: CLINIC | Age: 57
End: 2022-02-01
Payer: COMMERCIAL

## 2022-02-01 ENCOUNTER — OFFICE VISIT (OUTPATIENT)
Dept: OBGYN CLINIC | Facility: CLINIC | Age: 57
End: 2022-02-01
Payer: COMMERCIAL

## 2022-02-01 VITALS
WEIGHT: 212 LBS | HEART RATE: 53 BPM | DIASTOLIC BLOOD PRESSURE: 69 MMHG | SYSTOLIC BLOOD PRESSURE: 106 MMHG | HEIGHT: 70 IN | BODY MASS INDEX: 30.35 KG/M2

## 2022-02-01 DIAGNOSIS — S69.91XD INJURY OF RIGHT THUMB, SUBSEQUENT ENCOUNTER: ICD-10-CM

## 2022-02-01 DIAGNOSIS — S69.91XD INJURY OF RIGHT THUMB, SUBSEQUENT ENCOUNTER: Primary | ICD-10-CM

## 2022-02-01 PROCEDURE — 73140 X-RAY EXAM OF FINGER(S): CPT

## 2022-02-01 PROCEDURE — 99213 OFFICE O/P EST LOW 20 MIN: CPT | Performed by: ORTHOPAEDIC SURGERY

## 2022-02-01 NOTE — PROGRESS NOTES
Assessment/Plan:  1  Injury of right thumb, subsequent encounter  XR thumb right first digit-min 2v       Scribe Attestation    I,:  Karley Mandel MA am acting as a scribe while in the presence of the attending physician :       I,:  Lilibeth Valencia DO personally performed the services described in this documentation    as scribed in my presence  :             33-year-old female 9 weeks status post closed treatment for a base of distal phalanx fracture right thumb  Patient is doing well  She has no restrictions at this time  I discussed with her that the numbness she is experiencing should continue to resolve  She was instructed to continue with B vitamins  She will follow up in 3 months for repeat evaluation  Subjective:   Onofre Dale is a 49 y o  female who presents to the office today for follow up evaluation 9 weeks status post closed treatment for a probable distal phalanx base fracture right thumb sustained on 11/30/21  Patient states she is doing well  She notes tingling to the dorsal aspect of her thumb  This has been ongoing since the injury  She states this has slightly improved  She states the numbness is intermittent and has been improving  She has been compliant with B vitamins  She is able to perform ADLs without any issues  Review of Systems   Constitutional: Negative for chills and fever  HENT: Negative for drooling and sneezing  Eyes: Negative for redness  Respiratory: Negative for cough and wheezing  Gastrointestinal: Negative for nausea and vomiting  Musculoskeletal: Negative for arthralgias, joint swelling and myalgias  Neurological: Negative for weakness and numbness  Psychiatric/Behavioral: Negative for behavioral problems  The patient is not nervous/anxious            Past Medical History:   Diagnosis Date    Acid reflux     Arrhythmia     supraventricular    Bradyarrhythmia     Cholecystitis     De Quervain's disease (tenosynovitis)     Osteoarthritis  PONV (postoperative nausea and vomiting)     Urinary frequency     Wears glasses        Past Surgical History:   Procedure Laterality Date    ATRIAL ABLATION SURGERY      av cath ablation an node    BACK SURGERY      BUNIONECTOMY Bilateral     CERVICAL DISC SURGERY      CHOLECYSTECTOMY      HAND SURGERY      LUMBAR FUSION      NECK SURGERY      [place] ANKLE SCOPE,PLANTAR FASCIOTOMY Right 2016    Procedure: RELEASE FASCIA PLANTAR/FASCIOTOMY ENDOSCOPIC (EPF); Surgeon: Luis Feliciano DPM;  Location: [facility];  Service: Podiatry    TENDON REPAIR      WISDOM TOOTH EXTRACTION      WRIST SURGERY Right        Family History   Problem Relation Age of Onset    COPD Mother     Osteoporosis Mother     Coronary artery disease Father     Cancer Maternal Uncle     Scoliosis Sister        Social History     Occupational History    Not on file   Tobacco Use    Smoking status: Former Smoker     Packs/day: 1 00     Years: 15 00     Pack years: 15 00     Types: Cigarettes     Quit date: 2007     Years since quittin 7    Smokeless tobacco: Never Used    Tobacco comment: quit 13 years ago   Substance and Sexual Activity    Alcohol use: Yes     Alcohol/week: 0 0 standard drinks     Comment: 1x monthly    Drug use: No    Sexual activity: Yes     Partners: Male     Birth control/protection: I U D           Current Outpatient Medications:     Calcium Carbonate-Vit D-Min (CALCIUM 1200) 0312-9061 MG-UNIT CHEW, Chew, Disp: , Rfl:     Dexilant 60 MG capsule, Take 1 capsule by mouth daily, Disp: , Rfl:     oxybutynin (DITROPAN) 5 mg tablet, Take 5 mg by mouth 2 (two) times a day, Disp: , Rfl:     Trintellix 5 MG tablet, TAKE 1 TABLET BY ORAL ROUTE EVERY DAY AT THE SAME TIME EACH DAY, Disp: , Rfl:     No Known Allergies    Objective:  Vitals:    22 1110   BP: 106/69   Pulse: (!) 53       Ortho Exam     Right thumb    - tinel's  - durkan's  - finkelstein   - tinel's over scar   EPL FPL intact  Compartments soft  Brisk capillary refill  S/m intact median, radial, and ulnar nerve     Physical Exam  Constitutional:       Appearance: She is well-developed  HENT:      Head: Normocephalic and atraumatic  Eyes:      General:         Right eye: No discharge  Left eye: No discharge  Conjunctiva/sclera: Conjunctivae normal    Cardiovascular:      Rate and Rhythm: Normal rate  Pulmonary:      Effort: Pulmonary effort is normal  No respiratory distress  Musculoskeletal:      Cervical back: Normal range of motion and neck supple  Comments: As noted in HPI   Skin:     General: Skin is warm and dry  Neurological:      Mental Status: She is alert and oriented to person, place, and time  Psychiatric:         Behavior: Behavior normal          Thought Content: Thought content normal          Judgment: Judgment normal          I have personally reviewed pertinent films in PACS and my interpretation is as follows:X-ray right thumb performed in the office today demonstrates healing proximal phalanx base fracture

## 2022-02-07 ENCOUNTER — OFFICE VISIT (OUTPATIENT)
Dept: OBGYN CLINIC | Facility: CLINIC | Age: 57
End: 2022-02-07
Payer: COMMERCIAL

## 2022-02-07 ENCOUNTER — APPOINTMENT (OUTPATIENT)
Dept: RADIOLOGY | Facility: CLINIC | Age: 57
End: 2022-02-07
Payer: COMMERCIAL

## 2022-02-07 VITALS
SYSTOLIC BLOOD PRESSURE: 119 MMHG | DIASTOLIC BLOOD PRESSURE: 76 MMHG | BODY MASS INDEX: 31.21 KG/M2 | HEIGHT: 70 IN | HEART RATE: 66 BPM | WEIGHT: 218 LBS

## 2022-02-07 DIAGNOSIS — M25.512 LEFT SHOULDER PAIN, UNSPECIFIED CHRONICITY: ICD-10-CM

## 2022-02-07 DIAGNOSIS — M75.32 CALCIFIC TENDINITIS OF LEFT SHOULDER: Primary | ICD-10-CM

## 2022-02-07 PROCEDURE — 99214 OFFICE O/P EST MOD 30 MIN: CPT | Performed by: ORTHOPAEDIC SURGERY

## 2022-02-07 PROCEDURE — 73030 X-RAY EXAM OF SHOULDER: CPT

## 2022-02-07 NOTE — PROGRESS NOTES
Assessment/Plan:  1  Calcific tendinitis of left shoulder  US MSK limited    Ambulatory Referral to Physical Therapy   2  Left shoulder pain, unspecified chronicity  XR shoulder 2+ vw left       Scribe Attestation    I,:  Marquis Be am acting as a scribe while in the presence of the attending physician :       I,:  Nitesh Lacey MD personally performed the services described in this documentation    as scribed in my presence :             Upon evaluation and review of the left shoulder xrays taken today, I believe that Gregorio Sewell is presenting with signs and symptoms consistent with left shoulder calcific tendinitis of the rotator cuff affecting both the supraspinatus and infraspinatus  I discussed with Gregorio Sewell that for management of this condition, I recommend that she seek treatment in the for of an ultrasound guided-lavage of the left shoulder from interventional radiology  I discussed with Gregorio Sewell that after the treatment, she can continue to perform activities of daily living as tolerated and should benefit from additional treatment in the form of physical therapy starting approximately 1 week after the procedure  Mi elected to pursue this treatment plan and I provided Gregorio Sewell with orders for both an ultrasound guided lavage as well as physical therapy  I will follow up with Gregorio Sewell again as needed for repeat evaluation after completion of the ultrasound guided lavage if her symptoms worsen or fail to improve  Subjective:   García Diaz is a 49 y o  female who presents to the office today for initial evaluation of her left shoulder  Cy Model states that she has been experiencing left shoulder pain for about 2 years  She began to experience pain with lifting her arm over head as well as when driving  She was seen by her PCP and was treated with 3 steroid injection as well as physical therapy which gave her mild relief of her symptoms    She then had to take care of family and was unable to continue treatment  She then began to experience an increase in her symptoms and now reports a sharp pain which limits her function and mobility of the left shoulder  She denies any distal paresthesias but describes radiating pain about her left upper arm  Review of Systems   Constitutional: Negative for chills and fever  HENT: Negative for ear pain and sore throat  Eyes: Negative for pain and visual disturbance  Respiratory: Negative for cough and shortness of breath  Cardiovascular: Negative for chest pain and palpitations  Gastrointestinal: Negative for abdominal pain and vomiting  Genitourinary: Negative for dysuria and hematuria  Musculoskeletal: Positive for arthralgias  Negative for back pain  Skin: Negative for color change and rash  Neurological: Negative for seizures and syncope  All other systems reviewed and are negative  Past Medical History:   Diagnosis Date    Acid reflux     Arrhythmia     supraventricular    Bradyarrhythmia     Cholecystitis     De Quervain's disease (tenosynovitis)     Osteoarthritis     PONV (postoperative nausea and vomiting)     Urinary frequency     Wears glasses        Past Surgical History:   Procedure Laterality Date    ATRIAL ABLATION SURGERY      av cath ablation an node    BACK SURGERY      BUNIONECTOMY Bilateral     CERVICAL DISC SURGERY      CHOLECYSTECTOMY      HAND SURGERY      LUMBAR FUSION      NECK SURGERY      [place] ANKLE SCOPE,PLANTAR FASCIOTOMY Right 8/30/2016    Procedure: RELEASE FASCIA PLANTAR/FASCIOTOMY ENDOSCOPIC (EPF);   Surgeon: Dinesh Trujillo DPM;  Location: [facility];  Service: Podiatry    TENDON REPAIR      WISDOM TOOTH EXTRACTION      WRIST SURGERY Right        Family History   Problem Relation Age of Onset   Zafar Polio COPD Mother     Osteoporosis Mother     Coronary artery disease Father     Cancer Maternal Uncle     Scoliosis Sister        Social History     Occupational History    Not on file   Tobacco Use    Smoking status: Former Smoker     Packs/day: 1 00     Years: 15 00     Pack years: 15 00     Types: Cigarettes     Quit date: 2007     Years since quittin 7    Smokeless tobacco: Never Used    Tobacco comment: quit 13 years ago   Substance and Sexual Activity    Alcohol use: Yes     Alcohol/week: 0 0 standard drinks     Comment: 1x monthly    Drug use: No    Sexual activity: Yes     Partners: Male     Birth control/protection: I U D  Current Outpatient Medications:     Calcium Carbonate-Vit D-Min (CALCIUM 1200) 2436-2452 MG-UNIT CHEW, Chew, Disp: , Rfl:     Dexilant 60 MG capsule, Take 1 capsule by mouth daily, Disp: , Rfl:     oxybutynin (DITROPAN) 5 mg tablet, Take 5 mg by mouth 2 (two) times a day, Disp: , Rfl:     Trintellix 5 MG tablet, TAKE 1 TABLET BY ORAL ROUTE EVERY DAY AT THE SAME TIME EACH DAY, Disp: , Rfl:     No Known Allergies    Objective:  Vitals:    22 0815   BP: 119/76   Pulse: 66       Left Shoulder Exam     Range of Motion   Active abduction: 70   External rotation: 70   Forward flexion: 110   Internal rotation 0 degrees: L5     Muscle Strength   Abduction: 3/5   Internal rotation: 5/5   External rotation: 4/5     Tests   Impingement: positive    Other   Erythema: absent  Sensation: normal             Physical Exam  HENT:      Head: Normocephalic and atraumatic  Eyes:      General:         Right eye: No discharge  Left eye: No discharge  Extraocular Movements: Extraocular movements intact  Conjunctiva/sclera: Conjunctivae normal    Cardiovascular:      Rate and Rhythm: Normal rate  Pulmonary:      Effort: Pulmonary effort is normal  No respiratory distress  Musculoskeletal:         General: Tenderness present  Cervical back: Normal range of motion and neck supple  Skin:     General: Skin is warm and dry  Neurological:      Mental Status: She is alert and oriented to person, place, and time     Psychiatric: Mood and Affect: Mood normal          Behavior: Behavior normal          I have personally reviewed pertinent films in PACS and my interpretation is as follows:    Review of the left shoulder xrays taken today demonstrate a calcium deposit about the subacromial space consistent with calcific tendinitis of the left shoulder rotator cuff

## 2022-02-27 ENCOUNTER — APPOINTMENT (OUTPATIENT)
Dept: RADIOLOGY | Age: 57
End: 2022-02-27
Payer: COMMERCIAL

## 2022-02-27 ENCOUNTER — OFFICE VISIT (OUTPATIENT)
Dept: URGENT CARE | Age: 57
End: 2022-02-27
Payer: COMMERCIAL

## 2022-02-27 VITALS
BODY MASS INDEX: 30.78 KG/M2 | WEIGHT: 215 LBS | RESPIRATION RATE: 18 BRPM | TEMPERATURE: 97.9 F | HEIGHT: 70 IN | HEART RATE: 63 BPM | OXYGEN SATURATION: 98 %

## 2022-02-27 DIAGNOSIS — M25.361 INSTABILITY OF RIGHT KNEE JOINT: ICD-10-CM

## 2022-02-27 DIAGNOSIS — R52 PAIN: ICD-10-CM

## 2022-02-27 DIAGNOSIS — S83.91XA SPRAIN OF RIGHT KNEE, UNSPECIFIED LIGAMENT, INITIAL ENCOUNTER: Primary | ICD-10-CM

## 2022-02-27 PROCEDURE — G0382 LEV 3 HOSP TYPE B ED VISIT: HCPCS | Performed by: NURSE PRACTITIONER

## 2022-02-27 PROCEDURE — 73564 X-RAY EXAM KNEE 4 OR MORE: CPT

## 2022-02-27 PROCEDURE — S9083 URGENT CARE CENTER GLOBAL: HCPCS | Performed by: NURSE PRACTITIONER

## 2022-02-27 RX ORDER — VITAMIN B COMPLEX
1 CAPSULE ORAL DAILY
COMMUNITY
End: 2022-06-24

## 2022-02-27 RX ORDER — IBUPROFEN 200 MG
600 TABLET ORAL EVERY 6 HOURS PRN
COMMUNITY
End: 2022-03-25

## 2022-02-27 RX ORDER — PREDNISONE 50 MG/1
50 TABLET ORAL DAILY
Qty: 5 TABLET | Refills: 0 | Status: SHIPPED | OUTPATIENT
Start: 2022-02-27 | End: 2022-03-04

## 2022-02-27 NOTE — PATIENT INSTRUCTIONS
Knee Sprain   WHAT YOU NEED TO KNOW:   A knee sprain is a stretched or torn ligament in your knee  Ligaments support the knee and keep the joint and bones in the correct position  A knee sprain may involve one or more ligaments  DISCHARGE INSTRUCTIONS:   Return to the emergency department if:   · Any part of your leg feels cold, numb, or looks pale  Call your doctor if:   · You have new or increased swelling, bruising, or pain in your knee  · Your symptoms do not improve within 6 weeks, even with treatment  · You have questions or concerns about your condition or care  Medicines:   · NSAIDs , such as ibuprofen, help decrease swelling, pain, and fever  This medicine is available with or without a doctor's order  NSAIDs can cause stomach bleeding or kidney problems in certain people  If you take blood thinner medicine, always ask your healthcare provider if NSAIDs are safe for you  Always read the medicine label and follow directions  · Acetaminophen  decreases pain and fever  It is available without a doctor's order  Ask how much to take and how often to take it  Follow directions  Read the labels of all other medicines you are using to see if they also contain acetaminophen, or ask your doctor or pharmacist  Acetaminophen can cause liver damage if not taken correctly  Do not use more than 4 grams (4,000 milligrams) total of acetaminophen in one day  · Prescription pain medicine  may be given  Ask your healthcare provider how to take this medicine safely  Some prescription pain medicines contain acetaminophen  Do not take other medicines that contain acetaminophen without talking to your healthcare provider  Too much acetaminophen may cause liver damage  Prescription pain medicine may cause constipation  Ask your healthcare provider how to prevent or treat constipation  · Take your medicine as directed    Contact your healthcare provider if you think your medicine is not helping or if you have side effects  Tell him or her if you are allergic to any medicine  Keep a list of the medicines, vitamins, and herbs you take  Include the amounts, and when and why you take them  Bring the list or the pill bottles to follow-up visits  Carry your medicine list with you in case of an emergency  A support device  such as a splint or brace may be needed  These devices limit movement and protect the joint while it heals  You may be given crutches to use until you can stand on your injured leg without pain  Physical therapy  may be needed  A physical therapist teaches you exercises to help improve movement and strength, and to decrease pain  Manage a knee sprain:   · Rest  your knee and do not exercise  Do not walk on your injured leg if you are told to keep weight off your knee  Rest helps decrease swelling and allows the injury to heal  You can do gentle range of motion exercises as directed to prevent stiffness  · Apply ice  on your knee for 15 to 20 minutes every hour or as directed  Use an ice pack, or put crushed ice in a plastic bag  Cover the bag with a towel before you apply it  Ice helps prevent tissue damage and decreases swelling and pain  · Apply compression  to your knee as directed  You may need to wear an elastic bandage  This helps keep your injured knee from moving too much while it heals  It should be tight enough to give support but so tight that it causes your toes to feel numb or tingly  Take the bandage off and rewrap it at least 1 time each day  · Elevate your knee  above the level of your heart as often as you can  This will help decrease swelling and pain  Prop your leg on pillows or blankets to keep it elevated comfortably  Do not put pillows directly behind your knee  Prevent another knee sprain:  Exercise your legs to keep your muscles strong  Strong leg muscles help protect your knee and prevent strain   The following may also prevent a knee sprain:  · Slowly start your exercise or training program   Slowly increase the time, distance, and intensity of your exercise  Sudden increases in training may cause another knee sprain  · Wear protective braces and equipment as directed  Braces may prevent your knee from moving the wrong way and causing another sprain  Protective equipment may support your bones and ligaments to prevent injury  · Warm up and stretch before exercise  Warm up by walking or using an exercise bike before starting your regular exercise  Do gentle stretches after warming up  This helps to loosen your muscles and decrease stress on your knee  Cool down and stretch after you exercise  · Wear shoes that fit correctly and support your feet  Replace your running or exercise shoes before the padding or shock absorption is worn out  Ask your healthcare provider which exercise shoes are best for you  Ask if you should wear shoe inserts  Shoe inserts can help support your heels and arches or keep your foot lined up correctly in your shoes  Exercise on flat surfaces  Follow up with your doctor as directed:  Write down your questions so you remember to ask them during your visits  © Copyright [employer] 2022 Information is for End User's use only and may not be sold, redistributed or otherwise used for commercial purposes  All illustrations and images included in CareNotes® are the copyrighted property of A D A M , Inc  or [facility]hta   The above information is an  only  It is not intended as medical advice for individual conditions or treatments  Talk to your doctor, nurse or pharmacist before following any medical regimen to see if it is safe and effective for you

## 2022-03-15 ENCOUNTER — HOSPITAL ENCOUNTER (OUTPATIENT)
Dept: RADIOLOGY | Facility: HOSPITAL | Age: 57
Discharge: HOME | End: 2022-03-15
Attending: ORTHOPAEDIC SURGERY
Payer: COMMERCIAL

## 2022-03-15 DIAGNOSIS — M75.32 CALCIFIC TENDINITIS OF LEFT SHOULDER: ICD-10-CM

## 2022-03-15 PROCEDURE — 20611 DRAIN/INJ JOINT/BURSA W/US: CPT

## 2022-03-15 RX ORDER — LIDOCAINE HYDROCHLORIDE 10 MG/ML
10 INJECTION, SOLUTION EPIDURAL; INFILTRATION; INTRACAUDAL; PERINEURAL ONCE
Status: COMPLETED | OUTPATIENT
Start: 2022-03-15 | End: 2022-03-15

## 2022-03-15 RX ORDER — BUPIVACAINE HYDROCHLORIDE 2.5 MG/ML
10 INJECTION, SOLUTION EPIDURAL; INFILTRATION; INTRACAUDAL ONCE
Status: COMPLETED | OUTPATIENT
Start: 2022-03-15 | End: 2022-03-15

## 2022-03-15 RX ORDER — METHYLPREDNISOLONE ACETATE 80 MG/ML
80 INJECTION, SUSPENSION INTRA-ARTICULAR; INTRALESIONAL; INTRAMUSCULAR; SOFT TISSUE ONCE
Status: COMPLETED | OUTPATIENT
Start: 2022-03-15 | End: 2022-03-15

## 2022-03-15 RX ORDER — LIDOCAINE HYDROCHLORIDE 10 MG/ML
5 INJECTION, SOLUTION EPIDURAL; INFILTRATION; INTRACAUDAL; PERINEURAL ONCE
Status: COMPLETED | OUTPATIENT
Start: 2022-03-15 | End: 2022-03-15

## 2022-03-15 RX ADMIN — BUPIVACAINE HYDROCHLORIDE 2 ML: 2.5 INJECTION, SOLUTION EPIDURAL; INFILTRATION; INTRACAUDAL; PERINEURAL at 11:05

## 2022-03-15 RX ADMIN — LIDOCAINE HYDROCHLORIDE 10 ML: 10 INJECTION, SOLUTION EPIDURAL; INFILTRATION; INTRACAUDAL; PERINEURAL at 11:01

## 2022-03-15 RX ADMIN — LIDOCAINE HYDROCHLORIDE 2 ML: 10 INJECTION, SOLUTION EPIDURAL; INFILTRATION; INTRACAUDAL; PERINEURAL at 11:00

## 2022-03-15 RX ADMIN — METHYLPREDNISOLONE ACETATE 80 MG: 80 INJECTION, SUSPENSION INTRA-ARTICULAR; INTRALESIONAL; INTRAMUSCULAR; SOFT TISSUE at 11:05

## 2022-03-25 ENCOUNTER — OFFICE VISIT (OUTPATIENT)
Dept: OBGYN CLINIC | Facility: CLINIC | Age: 57
End: 2022-03-25
Payer: COMMERCIAL

## 2022-03-25 VITALS
HEART RATE: 64 BPM | SYSTOLIC BLOOD PRESSURE: 119 MMHG | BODY MASS INDEX: 31.5 KG/M2 | WEIGHT: 220 LBS | DIASTOLIC BLOOD PRESSURE: 74 MMHG | HEIGHT: 70 IN

## 2022-03-25 DIAGNOSIS — M75.32 CALCIFIC TENDINITIS OF LEFT SHOULDER: Primary | ICD-10-CM

## 2022-03-25 DIAGNOSIS — S83.91XA SPRAIN OF RIGHT KNEE, UNSPECIFIED LIGAMENT, INITIAL ENCOUNTER: ICD-10-CM

## 2022-03-25 PROCEDURE — 99214 OFFICE O/P EST MOD 30 MIN: CPT | Performed by: ORTHOPAEDIC SURGERY

## 2022-03-25 NOTE — PATIENT INSTRUCTIONS
Knee Sprain Exercises   AMBULATORY CARE:   What you need to know about a knee sprain:  A knee sprain occurs when one or more ligaments in your knee are suddenly stretched or torn  Ligaments are tissues that hold bones together  Ligaments support the knee and keep the joint and bones in the correct position  Treatment and recovery time depend on the type and severity of the knee sprain  Before you start doing knee exercises, follow your healthcare provider's recommendations for decreasing swelling and pain  Then, do knee stretches and strengthening exercises as directed  Decrease pain and swelling:   · Apply ice  to your knee for 15 to 20 minutes every hour or as directed  Use an ice pack, or put crushed ice in a plastic bag  Cover it with a towel  Ice helps prevent tissue damage and decreases swelling and pain  · Apply compression to your knee as directed  You may need to wear an elastic bandage  This helps keep your injured knee from moving too much while it heals  You can loosen or tighten the elastic bandage to make it comfortable  It should be tight enough for you to feel support  It should not be so tight that it causes your toes to be numb or tingly  If you are wearing an elastic bandage, take it off and rewrap it once a day  · Elevate your knee  above the level of your heart as often as you can  This will help decrease swelling and pain  Prop your leg on pillows or blankets to keep it elevated comfortably  Do not put pillows directly behind your knee  What you need to know about knee exercises:  Knee exercises help strengthen the muscles around your knee  Strong muscles can help reduce pain and decrease your risk of future injury  Knee exercises also help you heal after an injury or surgery  · Start slow  These are beginning exercises  Ask your healthcare provider if you need to see a physical therapist for more advanced exercises   As you get stronger, you may be able to do more sets of each exercise or add weights  · Stop if you feel pain  It is normal to feel some discomfort at first  Regular exercise will help decrease your discomfort over time  · Do the exercises on both legs  Do this so both knees remain strong  · Warm up before you do knee exercises  Walk or ride a stationary bike for 5 or 10 minutes to warm your muscles  How to perform knee stretches safely:  Always stretch before you do strengthening exercises  Do these stretching exercises again after you do the strengthening exercises  Do these stretches 4 or 5 days a week, or as directed  · Heel slides:  Sit or lie on the floor and put your legs out straight in front of you  Bend your knee so your foot is flat on the floor  Slowly slide your heel toward your buttocks  Keep your foot on the floor  You can also use a towel to help bring your foot back  Slowly slide your foot back to the starting position  · Standing calf stretch: Face a wall and place both palms flat on the wall, or hold the back of a chair for balance  Keep a slight bend in your knees  Take a big step backward with one leg  Keep your other leg directly under you  Keep both heels flat and press your hips forward  Hold the stretch for 30 seconds, and then relax for 30 seconds  Switch legs  Repeat 2 or 3 times on each leg  · Standing quadriceps stretch:  Stand and place one hand against a wall or hold the back of a chair for balance  With your weight on one leg, bend your other leg and grab your ankle  Bring your heel toward your buttocks  Hold the stretch for 30 to 60 seconds  Switch legs  Repeat 2 or 3 times on each leg  · Sitting hamstring stretch:  Sit with both legs straight in front of you  Do not point or flex your toes  Place your palms on the floor and slide your hands forward until you feel the stretch  Do not round your back  Hold the stretch for 30 seconds  Repeat 2 or 3 times         How to perform knee strengthening exercises safely:  Do these exercises 4 or 5 days a week, or as directed  · Standing half squats:  Stand with your feet shoulder-width apart  Lean your back against a wall or hold the back of a chair for balance, if needed  Slowly sit down about 10 inches, as if you are going to sit in a chair  Your body weight should be mostly over your heels  Hold the squat for 5 seconds, then rise to a standing position  Do 3 sets of 10 squats to strengthen your buttocks and thighs  · Standing hamstring curls: Face a wall and place both palms flat on the wall, or hold the back of a chair for balance  With your weight on one leg, lift your other foot as close to your buttocks as you can  Hold for 5 seconds and then lower your leg  Do 2 sets of 10 curls on each leg  This exercise strengthens the muscles in the back of your thigh  · Standing calf raises:  Face a wall and place both palms flat on the wall, or hold the back of a chair for balance  Stand up straight, and do not lean  Place all your weight on one leg by lifting the other foot off the floor  Raise the heel of the foot that is on the floor as high as you can and then lower it  Do 2 sets of 10 calf raises on each leg to strengthen your calf muscles  · Straight leg lifts (on your stomach):  Lie on your stomach with straight legs  Fold your arms in front of you and rest your head in your arms  Tighten your leg muscles and raise one leg as high as you can  Hold for 5 seconds, then lower your leg  Do 2 sets of 10 lifts on each leg to strengthen your buttocks  · Straight leg lifts (on your back):  Lie on a flat, firm surface  Bend your left leg until your foot is flat on the floor  Raise your right leg several inches off the floor and hold for 5 to 10 seconds  Lower your leg slowly  Repeat this exercise 5 to 10 times and then repeat on your other leg  · Sitting leg lifts:  Sit in a chair  Slowly straighten and raise one leg   Squeeze your thigh muscles and hold for 5 seconds  Relax and return your foot to the floor  Do 2 sets of 10 lifts on each leg  This helps strengthen the muscles in the front of your thigh  · Step ups:  Use a 6-inch stool, step, or other platform to do this exercise  Place one foot on top of the platform  Lift your other foot off the floor and let it hang loosely  Stay in that position for 3 to 5 seconds  Then, slowly lower your foot to the floor  Lower your other foot off the platform surface and onto the floor  Repeat this exercise 5 to 10 times and then repeat on your other leg  Contact your healthcare provider if:   · You have new pain or your pain becomes worse  · You have questions or concerns about your condition or care  © Copyright [employer] 2022 Information is for End User's use only and may not be sold, redistributed or otherwise used for commercial purposes  All illustrations and images included in CareNotes® are the copyrighted property of [employer] M , Inc  or [facility] Kim Mehta   The above information is an  only  It is not intended as medical advice for individual conditions or treatments  Talk to your doctor, nurse or pharmacist before following any medical regimen to see if it is safe and effective for you

## 2022-03-25 NOTE — PROGRESS NOTES
Assessment/Plan:  1  Calcific tendinitis of left shoulder     2  Sprain of right knee, unspecified ligament, initial encounter         Scribe Attestation    I,:  Crystal Esparza am acting as a scribe while in the presence of the attending physician :       I,:  Ute Stevens MD personally performed the services described in this documentation    as scribed in my presence :             Mi on examination review the results of the ultrasound-guided aspiration demonstrate significant symptomatic improvement with her left shoulder rotator cuff calcific tendinitis  She demonstrates functional strength motion all planes  She may continue with activities of daily living as tolerated with no restrictions  I will see her back on an as-needed basis in regards to her left shoulder  In regards to her right knee she does demonstrate signs and symptoms consistent with a posterior capsular strain is likely associated with a hyperextension injury  She demonstrates functional range of motion and strength in all planes and is grossly stable on exam   I do believe that she will continue to improve as time goes on and will do well with non operative treatment  With this in mind, I did provide her with a physician directed exercise program for her knee  My  instructed her on proper execution of these exercises today and was provided a green Thera-Band to facilitate the exercises  She will do these exercises over the next 4-6 weeks  If she fails to have symptomatic relief or has worsening symptoms she will follow-up with me and at that point we will order an MRI to question internal derangement  Otherwise, she may follow up with me on an as-needed basis  Subjective:   Tracy Canseco is a 56 y o  female who presents to the office today for follow-up evaluation of her left shoulder  She has been treated with the ultrasound-guided lavage aspiration for left shoulder calcific tendinitis    She is happy to report that she has had significant symptomatic improvement and is able to reach overhead with little to no difficulty  She states she is still improving with overall strength but has been able to partake in yd work with little difficulty  She states she is very happy with the results of the ultrasound-guided lavage aspiration  Today she denies any distal paresthesias  She does also complaints right knee pain  She localizes pain to posterior lateral aspect knee  She denies a traumatic fall resulting in her painful symptoms  She states that she did experience a locking sensation while at a brisk pace at mother's last week  She states that her pain is exacerbated weight-bearing activities and is better while at rest   She denies any distal paresthesias  She currently denies mechanical symptoms such as popping or locking of the knee  Review of Systems   Constitutional: Negative for chills, fever and unexpected weight change  HENT: Negative for hearing loss, nosebleeds and sore throat  Eyes: Negative for pain, redness and visual disturbance  Respiratory: Negative for cough, shortness of breath and wheezing  Cardiovascular: Negative for chest pain, palpitations and leg swelling  Gastrointestinal: Negative for abdominal pain, nausea and vomiting  Endocrine: Negative for polydipsia and polyuria  Genitourinary: Negative for dysuria and hematuria  Musculoskeletal: Positive for arthralgias and myalgias  See HPI   Skin: Negative for rash and wound  Neurological: Negative for dizziness, numbness and headaches  Psychiatric/Behavioral: Negative for decreased concentration and suicidal ideas  The patient is not nervous/anxious            Past Medical History:   Diagnosis Date    Acid reflux     Arrhythmia     supraventricular    Bradyarrhythmia     Cholecystitis     De Quervain's disease (tenosynovitis)     Osteoarthritis     PONV (postoperative nausea and vomiting)     Urinary frequency     Wears glasses        Past Surgical History:   Procedure Laterality Date    ATRIAL ABLATION SURGERY      av cath ablation an node    BACK SURGERY      BUNIONECTOMY Bilateral     CERVICAL DISC SURGERY      CHOLECYSTECTOMY      HAND SURGERY      LUMBAR FUSION      NECK SURGERY      MA ANKLE SCOPE,PLANTAR FASCIOTOMY Right 2016    Procedure: RELEASE FASCIA PLANTAR/FASCIOTOMY ENDOSCOPIC (EPF); Surgeon: Marc Chang DPM;  Location: [facility];  Service: Podiatry    TENDON REPAIR      WISDOM TOOTH EXTRACTION      WRIST SURGERY Right        Family History   Problem Relation Age of Onset    COPD Mother     Osteoporosis Mother     Coronary artery disease Father     Cancer Maternal Uncle     Scoliosis Sister        Social History     Occupational History    Not on file   Tobacco Use    Smoking status: Former Smoker     Packs/day: 1 00     Years: 15 00     Pack years: 15 00     Types: Cigarettes     Quit date: 2007     Years since quittin 9    Smokeless tobacco: Never Used    Tobacco comment: quit 13 years ago   Substance and Sexual Activity    Alcohol use: Yes     Alcohol/week: 0 0 standard drinks     Comment: 1x monthly    Drug use: No    Sexual activity: Yes     Partners: Male     Birth control/protection: I U D           Current Outpatient Medications:     b complex vitamins capsule, Take 1 capsule by mouth daily, Disp: , Rfl:     Calcium Carbonate-Vit D-Min (CALCIUM 1200) 5548-8980 MG-UNIT CHEW, Chew, Disp: , Rfl:     Dexilant 60 MG capsule, Take 1 capsule by mouth daily, Disp: , Rfl:     oxybutynin (DITROPAN) 5 mg tablet, Take 5 mg by mouth 2 (two) times a day, Disp: , Rfl:     Trintellix 5 MG tablet, TAKE 1 TABLET BY ORAL ROUTE EVERY DAY AT THE SAME TIME EACH DAY, Disp: , Rfl:     No Known Allergies    Objective:  Vitals:    22 0816   BP: 119/74   Pulse: 64       Right Knee Exam     Tenderness   Right knee tenderness location: Posterior lateral knee pain     Range of Motion   Extension: 0   Flexion: 120     Tests   Diandra:  Medial - negative Lateral - negative  Varus: negative Valgus: negative  Lachman:  Anterior - negative    Posterior - negative  Drawer:  Anterior - negative    Posterior - negative    Other   Erythema: absent  Scars: absent  Sensation: normal  Pulse: present  Swelling: none  Effusion: no effusion present      Left Shoulder Exam     Tenderness   The patient is experiencing no tenderness  Range of Motion   Active abduction: 170   External rotation: 70   Internal rotation 0 degrees: L2     Muscle Strength   Abduction: 5/5   Internal rotation: 5/5   External rotation: 5/5     Other   Erythema: absent  Scars: absent  Sensation: normal  Pulse: present           Observations     Right Knee   Negative for effusion  Physical Exam  Vitals reviewed  Constitutional:       Appearance: She is well-developed  HENT:      Head: Normocephalic and atraumatic  Eyes:      General:         Right eye: No discharge  Left eye: No discharge  Conjunctiva/sclera: Conjunctivae normal    Cardiovascular:      Rate and Rhythm: Normal rate  Pulmonary:      Effort: Pulmonary effort is normal  No respiratory distress  Musculoskeletal:      Cervical back: Normal range of motion and neck supple  Right knee: No effusion  Instability Tests: Medial Diandra test negative and lateral Diandra test negative  Comments: As noted in HPI   Skin:     General: Skin is warm and dry  Neurological:      Mental Status: She is alert and oriented to person, place, and time  Psychiatric:         Behavior: Behavior normal          Thought Content: Thought content normal          Judgment: Judgment normal          I have personally reviewed pertinent films in PACS and my interpretation is as follows:    X-rays of the right knee obtained on 2/27/2022 demonstrates no acute fracture or other osseous abnormalities

## 2022-05-03 ENCOUNTER — OFFICE VISIT (OUTPATIENT)
Dept: OBGYN CLINIC | Facility: CLINIC | Age: 57
End: 2022-05-03
Payer: COMMERCIAL

## 2022-05-03 VITALS — HEART RATE: 66 BPM | TEMPERATURE: 98.6 F | SYSTOLIC BLOOD PRESSURE: 121 MMHG | DIASTOLIC BLOOD PRESSURE: 81 MMHG

## 2022-05-03 DIAGNOSIS — S69.91XD INJURY OF RIGHT THUMB, SUBSEQUENT ENCOUNTER: Primary | ICD-10-CM

## 2022-05-03 PROCEDURE — 99213 OFFICE O/P EST LOW 20 MIN: CPT | Performed by: ORTHOPAEDIC SURGERY

## 2022-05-03 NOTE — PROGRESS NOTES
Assessment/Plan:  1  Injury of right thumb, subsequent encounter         Scribe Attestation    I,:  Monserrat Sousa PA-C am acting as a scribe while in the presence of the attending physician :       I,:  Sandi Arreola, DO personally performed the services described in this documentation    as scribed in my presence  :           56year old female approximately 5 months s/p closed treatment for distal phalanx base fracture  Patient currently asymptomatic and is doing very well on physical exam  At this time, she can proceed with activities as tolerated  No formal restrictions  We discussed that symptoms are resolved, patient can try weaning off the vitamin B  Currently, patient's nail does not appear to have deformity so this shouldn't be an issue, but pt can certainly call if an issues arise  Subjective:   Esther Valverde is a 56 y o  female who presents to the office today approximately 5 months s/p closed treatment for probable distal phalanx base fracture of the right thumb sustained on 11/30/21  Patient continued to have some numbness at her last follow up so we did want reevaluate to make sure this improved with time  Patient states she currently has no complaints with the thumb  She denies pain or n/t  She has taken B vitamins  She states she is able to perform all of her activities without issue  She states the distal portion of her nail did fall off but a new nail has regrown and she has no issues with this  Review of Systems   Musculoskeletal:        As noted in HPI  All other systems reviewed and are negative          Past Medical History:   Diagnosis Date    Acid reflux     Arrhythmia     supraventricular    Bradyarrhythmia     Cholecystitis     De Quervain's disease (tenosynovitis)     Osteoarthritis     PONV (postoperative nausea and vomiting)     Urinary frequency     Wears glasses        Past Surgical History:   Procedure Laterality Date    ATRIAL ABLATION SURGERY      av cath ablation an node    BACK SURGERY      BUNIONECTOMY Bilateral     CERVICAL DISC SURGERY      CHOLECYSTECTOMY      HAND SURGERY      LUMBAR FUSION      NECK SURGERY      MI ANKLE SCOPE,PLANTAR FASCIOTOMY Right 8/30/2016    Procedure: RELEASE FASCIA PLANTAR/FASCIOTOMY ENDOSCOPIC (EPF); Surgeon: Valeria Sky DPM;  Location: [facility];  Service: Podiatry    TENDON REPAIR      WISDOM TOOTH EXTRACTION      WRIST SURGERY Right        Family History   Problem Relation Age of Onset    COPD Mother     Osteoporosis Mother     Coronary artery disease Father     Cancer Maternal Uncle     Scoliosis Sister        Social History     Occupational History    Not on file   Tobacco Use    Smoking status: Former Smoker     Packs/day: 1 00     Years: 15 00     Pack years: 15 00     Types: Cigarettes     Quit date: 5/4/2007     Years since quitting: 15 0    Smokeless tobacco: Never Used    Tobacco comment: quit 13 years ago   Substance and Sexual Activity    Alcohol use: Yes     Alcohol/week: 0 0 standard drinks     Comment: 1x monthly    Drug use: No    Sexual activity: Yes     Partners: Male     Birth control/protection: I U D  Current Outpatient Medications:     b complex vitamins capsule, Take 1 capsule by mouth daily, Disp: , Rfl:     Calcium Carbonate-Vit D-Min (CALCIUM 1200) 1431-2040 MG-UNIT CHEW, Chew, Disp: , Rfl:     Dexilant 60 MG capsule, Take 1 capsule by mouth daily, Disp: , Rfl:     oxybutynin (DITROPAN) 5 mg tablet, Take 5 mg by mouth 2 (two) times a day, Disp: , Rfl:     Trintellix 5 MG tablet, TAKE 1 TABLET BY ORAL ROUTE EVERY DAY AT THE SAME TIME EACH DAY, Disp: , Rfl:     No Known Allergies    Objective:  Vitals:    05/03/22 1127   BP: 121/81   Pulse: 66   Temp: 98 6 °F (37 °C)       Ortho Exam   Right Thumb:  Skin intact  No edema, erythema, ecchymosis  Patient denies ttp along the base of the distal phalanx  Denies ttp along the nail    Nail is now fully grown, no concerns for nail deformity  FROM   EPL/FPL intact with 5/5 strength  SILT radial/ulnar/dorsal thumb  Brisk capillary refill  Physical Exam  Vitals reviewed  Constitutional:       Appearance: Normal appearance  She is well-developed  HENT:      Head: Normocephalic and atraumatic  Eyes:      Extraocular Movements: Extraocular movements intact  Conjunctiva/sclera: Conjunctivae normal    Neck:      Trachea: No tracheal deviation  Cardiovascular:      Pulses: Normal pulses  Pulmonary:      Effort: Pulmonary effort is normal    Abdominal:      Tenderness: There is no guarding  Skin:     General: Skin is warm and dry  Neurological:      Mental Status: She is alert and oriented to person, place, and time  Psychiatric:         Behavior: Behavior normal          Thought Content: Thought content normal          Judgment: Judgment normal          Studies Reviewed:  I have personally reviewed pertinent films in PACS and my interpretation is as follows:  None today      Procedures Performed:  Procedures  No Procedures performed today

## 2022-06-23 ENCOUNTER — CONSULT (OUTPATIENT)
Dept: GASTROENTEROLOGY | Facility: AMBULARY SURGERY CENTER | Age: 57
End: 2022-06-23
Payer: COMMERCIAL

## 2022-06-23 ENCOUNTER — TELEPHONE (OUTPATIENT)
Dept: GASTROENTEROLOGY | Facility: AMBULARY SURGERY CENTER | Age: 57
End: 2022-06-23

## 2022-06-23 VITALS
DIASTOLIC BLOOD PRESSURE: 74 MMHG | BODY MASS INDEX: 31.92 KG/M2 | WEIGHT: 223 LBS | HEART RATE: 68 BPM | OXYGEN SATURATION: 100 % | SYSTOLIC BLOOD PRESSURE: 118 MMHG | RESPIRATION RATE: 18 BRPM | HEIGHT: 70 IN

## 2022-06-23 DIAGNOSIS — K21.9 GASTROESOPHAGEAL REFLUX DISEASE WITHOUT ESOPHAGITIS: Primary | ICD-10-CM

## 2022-06-23 PROCEDURE — 99244 OFF/OP CNSLTJ NEW/EST MOD 40: CPT | Performed by: INTERNAL MEDICINE

## 2022-06-23 RX ORDER — SUCRALFATE ORAL 1 G/10ML
1 SUSPENSION ORAL 4 TIMES DAILY
Qty: 300 ML | Refills: 0 | Status: SHIPPED | OUTPATIENT
Start: 2022-06-23 | End: 2022-07-13 | Stop reason: SDUPTHER

## 2022-06-23 RX ORDER — FAMOTIDINE 40 MG/1
40 TABLET, FILM COATED ORAL
Qty: 30 TABLET | Refills: 6 | Status: SHIPPED | OUTPATIENT
Start: 2022-06-23 | End: 2022-07-18

## 2022-06-23 RX ORDER — DEXAMETHASONE 6 MG/1
6 TABLET ORAL DAILY
COMMUNITY
Start: 2022-06-10 | End: 2022-06-24

## 2022-06-23 NOTE — LETTER
June 24, 2022     MD Keyana Phan [address]  [address]     Patient: Shahnaz Tovar   YOB: 1965   Date of Visit: 6/23/2022       Dear Dr Jacquelyn Hall: Thank you for referring Migdalia Harry to me for evaluation  Below are my notes for this consultation  If you have questions, please do not hesitate to call me  I look forward to following your patient along with you  Sincerely,        Kody Veliz MD        CC: No Recipients  Kody Veliz MD  6/24/2022  2:49 PM  Sign when Signing Visit  Consultation - [facility]  Shahnaz Tovar 56 y o  female MRN: [id number]          Assessment & Plan:    Pleasant 53-year-old female, longstanding history of reflux which has worsened recently     1  GERD with dysphagia and odynophagia:   -likely worsening reflux symptoms, unclear what precipitated is   -discussed lifestyle modification  -recommend that she continue PPI therapy in the morning, will start famotidine in the evening as well Carafate  -given her odynophagia will proceed with an upper endoscopy to exclude underlying infectious etiology  -discussed with her risks of procedure including bleeding, surgery, perforation    2  Health maintenance:  -will need to get her scheduled for colonoscopy        Lord Echeverria was seen today for gerd and heartburn  Diagnoses and all orders for this visit:    Gastroesophageal reflux disease without esophagitis  -     famotidine (PEPCID) 40 MG tablet; Take 1 tablet (40 mg total) by mouth daily at bedtime  -     sucralfate (CARAFATE) 1 g/10 mL suspension; Take 10 mL (1 g total) by mouth 4 (four) times a day  -     EGD; Future    Other orders  -     Cancel: Diet NPO; Sips with meds; Standing  -     Cancel: Void on call to OR; Standing            _____________________________________________________________        CC:  GERD    HPI:  Shahnaz Tovar is a 56 y  o female who was referred for evaluation of GERD  This is a pleasant and healthy 60-year-old female, reports a longstanding history of acid reflux, had been on omeprazole for many years, several years ago was transitioned to 60 mg of Dexilant daily which she reports is quite effective  Recently she has had increasing reflux symptoms with retrosternal burning, epigastric pain  She has been taking Pepto-Bismol for symptom relief  More recently she has had pain and burning with swallowing, sore throat  She has tried to make dietary changes including lactose-free diet, has been careful about not eating within 3 4 hours of bedtime  She notes that she wakes up at night with retrosternal burning discomfort has tests sleep upright thereafter  Reports some nausea, denies any vomiting  Reports some pain with swallowing  She has had some irregular stools and dark stools, likely secondary to Pepto-Bismol use  Patient is otherwise healthy    Surgical history is notable for cholecystectomy, cervical neck and low back fusion surgery  Denies any tobacco, drinks about 1 glass per wine per week  She works as a   Family history is notable for maternal uncle with prostate cancer  ROS:  The remainder of the ROS was negative except for the pertinent positives mentioned in HPI  Allergies: Patient has no known allergies      Medications:   Current Outpatient Medications:     famotidine (PEPCID) 40 MG tablet, Take 1 tablet (40 mg total) by mouth daily at bedtime, Disp: 30 tablet, Rfl: 6    sucralfate (CARAFATE) 1 g/10 mL suspension, Take 10 mL (1 g total) by mouth 4 (four) times a day, Disp: 300 mL, Rfl: 0    Calcium Carbonate-Vit D-Min (CALCIUM 1200) 3143-8269 MG-UNIT CHEW, Chew daily after dinner, Disp: , Rfl:     Dexilant 60 MG capsule, Take 1 capsule by mouth every morning, Disp: , Rfl:     oxybutynin (DITROPAN) 5 mg tablet, Take 5 mg by mouth 2 (two) times a day, Disp: , Rfl:     Trintellix 5 MG tablet, 5 mg daily at bedtime, Disp: , Rfl:   No current facility-administered medications for this visit  Facility-Administered Medications Ordered in Other Visits:     lactated ringers infusion, 125 mL/hr, Intravenous, Continuous, Daniel Campos MD, Last Rate: 125 mL/hr at 06/24/22 1117, 125 mL/hr at 06/24/22 1117    lidocaine (PF) (XYLOCAINE-MPF) 1 % injection 0 5 mL, 0 5 mL, Infiltration, Once PRN, Daniel Campos MD'    Past Medical History:   Diagnosis Date    Acid reflux     Anxiety     Arrhythmia     supraventricular    Bradyarrhythmia     Chest pain     when supine    Cholecystitis     De Quervain's disease (tenosynovitis)     Esophagitis     GERD (gastroesophageal reflux disease)     Osteoarthritis     PONV (postoperative nausea and vomiting)     Urinary frequency     Wears glasses        Past Surgical History:   Procedure Laterality Date    ATRIAL ABLATION SURGERY      av cath ablation an node    BACK SURGERY      lower back fusion w/cage    BUNIONECTOMY Bilateral     CERVICAL DISC SURGERY      4 screws-replaced disc-C-3    CHOLECYSTECTOMY      EGD      HAND SURGERY Right     wrist-release of tendon    LUMBAR FUSION      SD ANKLE SCOPE,PLANTAR FASCIOTOMY Right 08/30/2016    Procedure: RELEASE FASCIA PLANTAR/FASCIOTOMY ENDOSCOPIC (EPF); Surgeon: Taniya Epstein DPM;  [facility];  Service: Podiatry    SHOULDER ADHESION RELEASE Left     calcium deposit-US treatment    TENDON REPAIR      WISDOM TOOTH EXTRACTION         Family History   Problem Relation Age of Onset    COPD Mother     Osteoporosis Mother     Other Mother         lung transplant-COPD, emphysema    Heart disease Father     Coronary artery disease Father     Other Father         MRSA infection    Scoliosis Sister     Cancer Maternal Uncle         reports that she quit smoking about 15 years ago  Her smoking use included cigarettes  She has a 15 00 pack-year smoking history   She has never used smokeless tobacco  She reports current alcohol use  She reports that she does not use drugs            Physical Exam:     /74 (BP Location: Left arm, Patient Position: Sitting, Cuff Size: Standard)   Pulse 68   Resp 18   Ht 5' 10" (1 778 m)   Wt 101 kg (223 lb)   SpO2 100%   BMI 32 00 kg/m²     Gen: wn/wd, NAD, healthy-appearing female  HEENT: anicteric, MMM, no cervical LAD  CVS: RRR, no m/r/g  CHEST: CTA b/l  ABD: +BS, soft, NT,ND, no hepatosplenomegaly  EXT: no c/c/e  NEURO: aaox3  SKIN: NO rashes

## 2022-06-23 NOTE — TELEPHONE ENCOUNTER
Patient is scheduled for EGD on June 24 , 2022 at [address] [place] [address] with Russel Luong MD  Patient is aware of pre-procedure prep of Nothing to eat of drink after midnight and they will be called the day prior between 2 and 6 pm for time to report for procedure  Pre-procedure prep has been given to the patient  in person  on June 23 , 2022

## 2022-06-23 NOTE — PATIENT INSTRUCTIONS
Scheduled date of EGD(as of today): 6/24/2022  Physician performing EGD: Dr Riya Lozano  Location of EGD: [facility]  Instructions reviewed with patient by: Josr Ghosh  Clearances:  None

## 2022-06-24 ENCOUNTER — ANESTHESIA EVENT (OUTPATIENT)
Dept: GASTROENTEROLOGY | Facility: AMBULARY SURGERY CENTER | Age: 57
End: 2022-06-24

## 2022-06-24 ENCOUNTER — HOSPITAL ENCOUNTER (OUTPATIENT)
Dept: GASTROENTEROLOGY | Facility: AMBULARY SURGERY CENTER | Age: 57
Setting detail: OUTPATIENT SURGERY
Discharge: HOME | End: 2022-06-24
Attending: INTERNAL MEDICINE | Admitting: INTERNAL MEDICINE
Payer: COMMERCIAL

## 2022-06-24 ENCOUNTER — ANESTHESIA (OUTPATIENT)
Dept: GASTROENTEROLOGY | Facility: AMBULARY SURGERY CENTER | Age: 57
End: 2022-06-24

## 2022-06-24 VITALS
RESPIRATION RATE: 16 BRPM | HEART RATE: 48 BPM | TEMPERATURE: 98.4 F | DIASTOLIC BLOOD PRESSURE: 76 MMHG | SYSTOLIC BLOOD PRESSURE: 144 MMHG | OXYGEN SATURATION: 100 %

## 2022-06-24 DIAGNOSIS — K21.9 GASTROESOPHAGEAL REFLUX DISEASE WITHOUT ESOPHAGITIS: ICD-10-CM

## 2022-06-24 PROCEDURE — 88305 TISSUE EXAM BY PATHOLOGIST: CPT | Performed by: PATHOLOGY

## 2022-06-24 PROCEDURE — 43239 EGD BIOPSY SINGLE/MULTIPLE: CPT | Performed by: INTERNAL MEDICINE

## 2022-06-24 RX ORDER — SODIUM CHLORIDE, SODIUM LACTATE, POTASSIUM CHLORIDE, CALCIUM CHLORIDE 600; 310; 30; 20 MG/100ML; MG/100ML; MG/100ML; MG/100ML
INJECTION, SOLUTION INTRAVENOUS CONTINUOUS PRN
Status: DISCONTINUED | OUTPATIENT
Start: 2022-06-24 | End: 2022-06-24

## 2022-06-24 RX ORDER — SODIUM CHLORIDE, SODIUM LACTATE, POTASSIUM CHLORIDE, CALCIUM CHLORIDE 600; 310; 30; 20 MG/100ML; MG/100ML; MG/100ML; MG/100ML
125 INJECTION, SOLUTION INTRAVENOUS CONTINUOUS
Status: DISCONTINUED | OUTPATIENT
Start: 2022-06-24 | End: 2022-06-28 | Stop reason: HOSPADM

## 2022-06-24 RX ORDER — LIDOCAINE HYDROCHLORIDE 10 MG/ML
0.5 INJECTION, SOLUTION EPIDURAL; INFILTRATION; INTRACAUDAL; PERINEURAL ONCE AS NEEDED
Status: DISCONTINUED | OUTPATIENT
Start: 2022-06-24 | End: 2022-06-28 | Stop reason: HOSPADM

## 2022-06-24 RX ORDER — LIDOCAINE HYDROCHLORIDE 10 MG/ML
INJECTION, SOLUTION EPIDURAL; INFILTRATION; INTRACAUDAL; PERINEURAL AS NEEDED
Status: DISCONTINUED | OUTPATIENT
Start: 2022-06-24 | End: 2022-06-24

## 2022-06-24 RX ORDER — ONDANSETRON 2 MG/ML
INJECTION INTRAMUSCULAR; INTRAVENOUS AS NEEDED
Status: DISCONTINUED | OUTPATIENT
Start: 2022-06-24 | End: 2022-06-24

## 2022-06-24 RX ORDER — PROPOFOL 10 MG/ML
INJECTION, EMULSION INTRAVENOUS AS NEEDED
Status: DISCONTINUED | OUTPATIENT
Start: 2022-06-24 | End: 2022-06-24

## 2022-06-24 RX ADMIN — PROPOFOL 100 MG: 10 INJECTION, EMULSION INTRAVENOUS at 12:39

## 2022-06-24 RX ADMIN — ONDANSETRON 4 MG: 2 INJECTION INTRAMUSCULAR; INTRAVENOUS at 12:36

## 2022-06-24 RX ADMIN — SODIUM CHLORIDE, SODIUM LACTATE, POTASSIUM CHLORIDE, AND CALCIUM CHLORIDE: .6; .31; .03; .02 INJECTION, SOLUTION INTRAVENOUS at 12:32

## 2022-06-24 RX ADMIN — SODIUM CHLORIDE, SODIUM LACTATE, POTASSIUM CHLORIDE, AND CALCIUM CHLORIDE 125 ML/HR: .6; .31; .03; .02 INJECTION, SOLUTION INTRAVENOUS at 11:17

## 2022-06-24 RX ADMIN — LIDOCAINE HYDROCHLORIDE 5 ML: 10 INJECTION, SOLUTION EPIDURAL; INFILTRATION; INTRACAUDAL; PERINEURAL at 12:38

## 2022-06-24 RX ADMIN — PROPOFOL 100 MG: 10 INJECTION, EMULSION INTRAVENOUS at 12:40

## 2022-06-24 NOTE — ANESTHESIA POSTPROCEDURE EVALUATION
Post-Op Assessment Note    CV Status:  Stable  Pain Score: 0    Pain management: adequate     Mental Status:  Awake   Hydration Status:  Stable   PONV Controlled:  None   Airway Patency:  Patent      Post Op Vitals Reviewed: Yes      Staff: CRNA   Comments: spontaneously breathing, protecting aurway, vss, fully endorsed to recovery w/o AC        No complications documented      BP   115/64   Temp      Pulse  75   Resp   15   SpO2 100

## 2022-06-24 NOTE — ANESTHESIA PREPROCEDURE EVALUATION
Procedure:  EGD    Relevant Problems   ANESTHESIA   (+) PONV (postoperative nausea and vomiting)      CARDIO   (+) Bradyarrhythmia      ENDO (within normal limits)      GI/HEPATIC   (+) Acid reflux      MUSCULOSKELETAL   (+) Patellar tendinitis of left knee      NEURO/PSYCH   (+) Anxiety      PULMONARY (within normal limits)      Musculoskeletal and Integument   (+) Patellofemoral syndrome of left knee   (+) Plantar fascial fibromatosis of right foot      Other   (+) Class 1 obesity in adult        Physical Exam    Airway    Mallampati score: II  TM Distance: >3 FB  Neck ROM: full     Dental   No notable dental hx     Cardiovascular      Pulmonary      Other Findings        Anesthesia Plan  ASA Score- 2     Anesthesia Type- IV sedation with anesthesia with ASA Monitors  Additional Monitors:   Airway Plan:     Comment: Antiemetic planned  Plan Factors-Exercise tolerance (METS): >4 METS  Chart reviewed  EKG reviewed  Existing labs reviewed  Patient summary reviewed  Patient is not a current smoker  Induction-     Postoperative Plan-     Informed Consent- Anesthetic plan and risks discussed with patient  I personally reviewed this patient with the CRNA  Discussed and agreed on the Anesthesia Plan with the CRNA  Sherin Steven

## 2022-06-24 NOTE — PROGRESS NOTES
Consultation - [facility]  Ariana Huff 56 y o  female MRN: [id number]          Assessment & Plan:    Pleasant 56-year-old female, longstanding history of reflux which has worsened recently     1  GERD with dysphagia and odynophagia:   -likely worsening reflux symptoms, unclear what precipitated is   -discussed lifestyle modification  -recommend that she continue PPI therapy in the morning, will start famotidine in the evening as well Carafate  -given her odynophagia will proceed with an upper endoscopy to exclude underlying infectious etiology  -discussed with her risks of procedure including bleeding, surgery, perforation    2  Health maintenance:  -will need to get her scheduled for colonoscopy        Early  was seen today for gerd and heartburn  Diagnoses and all orders for this visit:    Gastroesophageal reflux disease without esophagitis  -     famotidine (PEPCID) 40 MG tablet; Take 1 tablet (40 mg total) by mouth daily at bedtime  -     sucralfate (CARAFATE) 1 g/10 mL suspension; Take 10 mL (1 g total) by mouth 4 (four) times a day  -     EGD; Future    Other orders  -     Cancel: Diet NPO; Sips with meds; Standing  -     Cancel: Void on call to OR; Standing            _____________________________________________________________        CC:  GERD    HPI:  Ariana Huff is a 56 y  o female who was referred for evaluation of GERD  This is a pleasant and healthy 56-year-old female, reports a longstanding history of acid reflux, had been on omeprazole for many years, several years ago was transitioned to 60 mg of Dexilant daily which she reports is quite effective  Recently she has had increasing reflux symptoms with retrosternal burning, epigastric pain  She has been taking Pepto-Bismol for symptom relief  More recently she has had pain and burning with swallowing, sore throat        She has tried to make dietary changes including lactose-free diet, has been careful about not eating within 3 4 hours of bedtime  She notes that she wakes up at night with retrosternal burning discomfort has tests sleep upright thereafter  Reports some nausea, denies any vomiting  Reports some pain with swallowing  She has had some irregular stools and dark stools, likely secondary to Pepto-Bismol use  Patient is otherwise healthy    Surgical history is notable for cholecystectomy, cervical neck and low back fusion surgery  Denies any tobacco, drinks about 1 glass per wine per week  She works as a   Family history is notable for maternal uncle with prostate cancer  ROS:  The remainder of the ROS was negative except for the pertinent positives mentioned in HPI  Allergies: Patient has no known allergies  Medications:   Current Outpatient Medications:     famotidine (PEPCID) 40 MG tablet, Take 1 tablet (40 mg total) by mouth daily at bedtime, Disp: 30 tablet, Rfl: 6    sucralfate (CARAFATE) 1 g/10 mL suspension, Take 10 mL (1 g total) by mouth 4 (four) times a day, Disp: 300 mL, Rfl: 0    Calcium Carbonate-Vit D-Min (CALCIUM 1200) 5378-9797 MG-UNIT CHEW, Chew daily after dinner, Disp: , Rfl:     Dexilant 60 MG capsule, Take 1 capsule by mouth every morning, Disp: , Rfl:     oxybutynin (DITROPAN) 5 mg tablet, Take 5 mg by mouth 2 (two) times a day, Disp: , Rfl:     Trintellix 5 MG tablet, 5 mg daily at bedtime, Disp: , Rfl:   No current facility-administered medications for this visit      Facility-Administered Medications Ordered in Other Visits:     lactated ringers infusion, 125 mL/hr, Intravenous, Continuous, George Pinedo MD, Last Rate: 125 mL/hr at 06/24/22 1117, 125 mL/hr at 06/24/22 1117    lidocaine (PF) (XYLOCAINE-MPF) 1 % injection 0 5 mL, 0 5 mL, Infiltration, Once PRN, George Pinedo MD'    Past Medical History:   Diagnosis Date    Acid reflux     Anxiety     Arrhythmia     supraventricular    Bradyarrhythmia     Chest pain     when supine    Cholecystitis     De Quervain's disease (tenosynovitis)     Esophagitis     GERD (gastroesophageal reflux disease)     Osteoarthritis     PONV (postoperative nausea and vomiting)     Urinary frequency     Wears glasses        Past Surgical History:   Procedure Laterality Date    ATRIAL ABLATION SURGERY      av cath ablation an node    BACK SURGERY      lower back fusion w/cage    BUNIONECTOMY Bilateral     CERVICAL DISC SURGERY      4 screws-replaced disc-C-3    CHOLECYSTECTOMY      EGD      HAND SURGERY Right     wrist-release of tendon    LUMBAR FUSION      MD ANKLE SCOPE,PLANTAR FASCIOTOMY Right 08/30/2016    Procedure: RELEASE FASCIA PLANTAR/FASCIOTOMY ENDOSCOPIC (EPF); Surgeon: Joycelyn Cummings DPM;  Location: [facility];  Service: Podiatry    SHOULDER ADHESION RELEASE Left     calcium deposit-US treatment    TENDON REPAIR      WISDOM TOOTH EXTRACTION         Family History   Problem Relation Age of Onset    COPD Mother     Osteoporosis Mother     Other Mother         lung transplant-COPD, emphysema    Heart disease Father     Coronary artery disease Father     Other Father         MRSA infection    Scoliosis Sister     Cancer Maternal Uncle         reports that she quit smoking about 15 years ago  Her smoking use included cigarettes  She has a 15 00 pack-year smoking history  She has never used smokeless tobacco  She reports current alcohol use  She reports that she does not use drugs            Physical Exam:     /74 (BP Location: Left arm, Patient Position: Sitting, Cuff Size: Standard)   Pulse 68   Resp 18   Ht 5' 10" (1 778 m)   Wt 101 kg (223 lb)   SpO2 100%   BMI 32 00 kg/m²     Gen: wn/wd, NAD, healthy-appearing female  HEENT: anicteric, MMM, no cervical LAD  CVS: RRR, no m/r/g  CHEST: CTA b/l  ABD: +BS, soft, NT,ND, no hepatosplenomegaly  EXT: no c/c/e  NEURO: aaox3  SKIN: NO rashes

## 2022-06-24 NOTE — H&P
History and Physical - [facility]  Cristina Torres 56 y o  female MRN: [id number]    HPI: Cristina Torres is a 56y o  year old female who presents with GERD, odynophagia      Review of Systems    Historical Information   Past Medical History:   Diagnosis Date    Acid reflux     Anxiety     Arrhythmia     supraventricular    Bradyarrhythmia     Chest pain     when supine    Cholecystitis     De Quervain's disease (tenosynovitis)     Esophagitis     GERD (gastroesophageal reflux disease)     Osteoarthritis     PONV (postoperative nausea and vomiting)     Urinary frequency     Wears glasses      Past Surgical History:   Procedure Laterality Date    ATRIAL ABLATION SURGERY      av cath ablation an node    BACK SURGERY      lower back fusion w/cage    BUNIONECTOMY Bilateral     CERVICAL DISC SURGERY      4 screws-replaced disc-C-3    CHOLECYSTECTOMY      EGD      HAND SURGERY Right     wrist-release of tendon    LUMBAR FUSION      [place] ANKLE SCOPE,PLANTAR FASCIOTOMY Right 08/30/2016    Procedure: RELEASE FASCIA PLANTAR/FASCIOTOMY ENDOSCOPIC (EPF);   Surgeon: Chaparrita Guillory DPM;  Location: [facility];  Service: Podiatry    SHOULDER ADHESION RELEASE Left     calcium deposit-US treatment    TENDON REPAIR      WISDOM TOOTH EXTRACTION       Social History   Social History     Substance and Sexual Activity   Alcohol Use Yes    Alcohol/week: 0 0 standard drinks    Comment: 1x monthly     Social History     Substance and Sexual Activity   Drug Use No     Social History     Tobacco Use   Smoking Status Former Smoker    Packs/day: 1 00    Years: 15 00    Pack years: 15 00    Types: Cigarettes    Quit date: 5/4/2007    Years since quitting: 15 1   Smokeless Tobacco Never Used   Tobacco Comment    quit 15 years ago     Family History   Problem Relation Age of Onset   Oli Young COPD Mother     Osteoporosis Mother     Other Mother         lung transplant-COPD, emphysema    Heart disease Father     Coronary artery disease Father     Other Father         MRSA infection    Scoliosis Sister     Cancer Maternal Uncle        Meds/Allergies     (Not in a hospital admission)      No Known Allergies    Objective     /65   Pulse 57   Temp 98 4 °F (36 9 °C) (Temporal)   Resp 18   LMP 12/11/2017   SpO2 97%       PHYSICAL EXAM    Gen: NAD  CV: RRR  CHEST: Clear  ABD: soft, NT/ND  EXT: no edema  Neuro: AAO      ASSESSMENT/PLAN:  This is a 56y o  year old female here for GERD, odynophagia      PLAN:   Procedure: egd

## 2022-07-13 DIAGNOSIS — K21.9 GASTROESOPHAGEAL REFLUX DISEASE WITHOUT ESOPHAGITIS: ICD-10-CM

## 2022-07-14 RX ORDER — SUCRALFATE ORAL 1 G/10ML
1 SUSPENSION ORAL 4 TIMES DAILY
Qty: 828 ML | Refills: 2 | Status: SHIPPED | OUTPATIENT
Start: 2022-07-14

## 2022-07-15 DIAGNOSIS — K21.9 GASTROESOPHAGEAL REFLUX DISEASE WITHOUT ESOPHAGITIS: ICD-10-CM

## 2022-07-18 RX ORDER — FAMOTIDINE 40 MG/1
TABLET, FILM COATED ORAL
Qty: 90 TABLET | Refills: 3 | Status: SHIPPED | OUTPATIENT
Start: 2022-07-18

## 2022-07-20 DIAGNOSIS — K21.00 GASTROESOPHAGEAL REFLUX DISEASE WITH ESOPHAGITIS WITHOUT HEMORRHAGE: ICD-10-CM

## 2022-07-20 DIAGNOSIS — K21.00 GASTROESOPHAGEAL REFLUX DISEASE WITH ESOPHAGITIS WITHOUT HEMORRHAGE: Primary | ICD-10-CM

## 2022-07-20 RX ORDER — PANTOPRAZOLE SODIUM 40 MG/1
40 TABLET, DELAYED RELEASE ORAL 2 TIMES DAILY
Qty: 60 TABLET | Refills: 3 | Status: SHIPPED | OUTPATIENT
Start: 2022-07-20 | End: 2022-07-21

## 2022-07-21 ENCOUNTER — TELEPHONE (OUTPATIENT)
Dept: GASTROENTEROLOGY | Facility: CLINIC | Age: 57
End: 2022-07-21

## 2022-07-21 RX ORDER — PANTOPRAZOLE SODIUM 40 MG/1
TABLET, DELAYED RELEASE ORAL
Qty: 60 TABLET | Refills: 3 | Status: SHIPPED | OUTPATIENT
Start: 2022-07-21 | End: 2022-08-17

## 2022-07-21 NOTE — TELEPHONE ENCOUNTER
----- Message from Gómez Cespedes sent at 7/21/2022  2:08 PM EDT -----    ----- Message -----  From: Quinn Guadalupe MD  Sent: 7/20/2022   2:27 PM EDT  To: Star Gonzalez, #    The biopsies from your recent upper endoscopy showed a high number of eosinophils in her esophagus, this could be due to acid reflux, could also be due to an allergic condition of the esophagus  This may explain some of the difficulty swallowing  Please take pantoprazole 40 mg twice daily, we should repeat your upper endoscopy in 3 months time to make sure this has improved  If you continue to have a high number of eosinophils, we will then start additional medications to treat this    Please call with any questions or concerns  Please let us know if her swallowing function has improved

## 2022-07-21 NOTE — TELEPHONE ENCOUNTER
lmom asking patient to contact the office for results and recommendations  Message was also set to  to reach out to patient to set up repeat

## 2022-07-22 NOTE — TELEPHONE ENCOUNTER
Patient is scheduled for EGD on October 10, 2022 at [facility]  with Naveed Ruiz MD  Patient is aware of pre-procedure prep of Nothing to eat of drink after midnight and they will be called the day prior between 2 and 6 pm for time to report for procedure  Pre-procedure prep has been given to the patient  via E-mail on July 22 , 2022

## 2022-07-27 NOTE — TELEPHONE ENCOUNTER
Called and relayed results to patient she stated she still is having the scratching feeling in the throat but she will continue to take the medications     Her pantoprazole is only covered for daily tried submitting auth via covernymeds but it's not populating will reach out to patient's insurance

## 2022-08-01 NOTE — TELEPHONE ENCOUNTER
Called and spoke to patient's insurance they stated this needs to be submitted via covermymeds where I did state I've tried this and tried resubmitting with the rep on the phone and I did have the same issues she is sending a form via fax

## 2022-08-05 ENCOUNTER — OFFICE VISIT (OUTPATIENT)
Dept: OBGYN CLINIC | Facility: CLINIC | Age: 57
End: 2022-08-05
Payer: COMMERCIAL

## 2022-08-05 ENCOUNTER — APPOINTMENT (OUTPATIENT)
Dept: RADIOLOGY | Facility: CLINIC | Age: 57
End: 2022-08-05
Payer: COMMERCIAL

## 2022-08-05 VITALS
HEART RATE: 65 BPM | BODY MASS INDEX: 31.92 KG/M2 | SYSTOLIC BLOOD PRESSURE: 121 MMHG | HEIGHT: 70 IN | DIASTOLIC BLOOD PRESSURE: 78 MMHG | WEIGHT: 223 LBS

## 2022-08-05 DIAGNOSIS — M24.812 INTERNAL DERANGEMENT OF LEFT SHOULDER: Primary | ICD-10-CM

## 2022-08-05 DIAGNOSIS — M25.512 LEFT SHOULDER PAIN, UNSPECIFIED CHRONICITY: ICD-10-CM

## 2022-08-05 PROCEDURE — 99214 OFFICE O/P EST MOD 30 MIN: CPT | Performed by: PHYSICIAN ASSISTANT

## 2022-08-05 PROCEDURE — 73030 X-RAY EXAM OF SHOULDER: CPT

## 2022-08-05 NOTE — PROGRESS NOTES
Assessment/Plan:  1  Internal derangement of left shoulder  MRI shoulder left wo contrast   2  Left shoulder pain, unspecified chronicity  XR shoulder 2+ vw left    MRI shoulder left wo contrast       Mi upon examination and review the x-rays obtained today of the left shoulder compared to previous x-rays prior to her motor lavage aspiration does not demonstrate any recurrence of calcific tendinitis  She also does not demonstrate signs and symptoms concerning for adhesive capsulitis  I do have concern that she has a rotator cuff tear as she is limited with range of motion secondary to pain demonstrates significant weakness and pain with abduction and external rotation  The failure of prolonged symptomatic relief with the ultrasound-guided lavage aspiration and her clinical exam today I would like to order an MRI of the left shoulder question rotator cuff tear  My office will help facilitate having the MRI scheduled  She will follow up with Dr Karen Hdz once the MRI of her left shoulder is completed for further delineation of her care  Subjective:   García Diaz is a 56 y o  female who presents to the office today for evaluation of her left shoulder  She has been treated referral to interventional Radiology for a tendinitis deformity ultrasound-guided lavage aspiration  Initially this provided her with great symptomatic relief  Unfortunately, she has had recurrence of painful symptoms into her shoulder  This has been ongoing for approximately 4 weeks  She denies a traumatic injury resulting in the recurrence of her pain  She describes the pain is anterior and lateral sharp pain that can be moderate to severe in intensity  She has utilized Tylenol that has failed to provide her with symptomatic relief  She notes that she does try to keep her arm close to her side as this does alleviate some of her pain  She is unable to reach overhead but can reach behind her back    Today she denies distal paresthesias  However notes that she does get intermittent tingling into the anterior aspect of her forearm  She states she does have a history of a disc replacement in her cervical spine and additional herniations treated by another physician  Review of Systems   Constitutional: Negative for chills, fever and unexpected weight change  HENT: Negative for hearing loss, nosebleeds and sore throat  Eyes: Negative for pain, redness and visual disturbance  Respiratory: Negative for cough, shortness of breath and wheezing  Cardiovascular: Negative for chest pain, palpitations and leg swelling  Gastrointestinal: Negative for abdominal pain, nausea and vomiting  Endocrine: Negative for polydipsia and polyuria  Genitourinary: Negative for dysuria and hematuria  Musculoskeletal: Positive for arthralgias and myalgias  See HPI   Skin: Negative for rash and wound  Neurological: Negative for dizziness, numbness and headaches  Psychiatric/Behavioral: Negative for decreased concentration and suicidal ideas  The patient is not nervous/anxious            Past Medical History:   Diagnosis Date    Acid reflux     Anxiety     Arrhythmia     supraventricular    Bradyarrhythmia     Chest pain     when supine    Cholecystitis     De Quervain's disease (tenosynovitis)     Esophagitis     GERD (gastroesophageal reflux disease)     Osteoarthritis     PONV (postoperative nausea and vomiting)     Urinary frequency     Wears glasses        Past Surgical History:   Procedure Laterality Date    ATRIAL ABLATION SURGERY      av cath ablation an node    BACK SURGERY      lower back fusion w/cage    BUNIONECTOMY Bilateral     CERVICAL DISC SURGERY      4 screws-replaced disc-C-3    CHOLECYSTECTOMY      EGD      HAND SURGERY Right     wrist-release of tendon    LUMBAR FUSION      [place] ANKLE SCOPE,PLANTAR FASCIOTOMY Right 08/30/2016    Procedure: RELEASE FASCIA PLANTAR/FASCIOTOMY ENDOSCOPIC (EPF);  Surgeon: Tanya Borja DPM;  Location: [facility];  Service: Podiatry    SHOULDER ADHESION RELEASE Left     calcium deposit-US treatment    TENDON REPAIR      WISDOM TOOTH EXTRACTION         Family History   Problem Relation Age of Onset    COPD Mother     Osteoporosis Mother     Other Mother         lung transplant-COPD, emphysema    Heart disease Father     Coronary artery disease Father     Other Father         MRSA infection    Scoliosis Sister     Cancer Maternal Uncle        Social History     Occupational History    Not on file   Tobacco Use    Smoking status: Former Smoker     Packs/day: 1 00     Years: 15 00     Pack years: 15 00     Types: Cigarettes     Quit date: 5/4/2007     Years since quitting: 15 2    Smokeless tobacco: Never Used    Tobacco comment: quit 13 years ago   Substance and Sexual Activity    Alcohol use:  Yes     Alcohol/week: 0 0 standard drinks     Comment: 1x monthly    Drug use: No    Sexual activity: Yes     Partners: Male         Current Outpatient Medications:     Calcium Carbonate-Vit D-Min (CALCIUM 1200) 4545-4559 MG-UNIT CHEW, Chew daily after dinner, Disp: , Rfl:     Dexilant 60 MG capsule, Take 1 capsule by mouth every morning, Disp: , Rfl:     famotidine (PEPCID) 40 MG tablet, TAKE 1 TABLET BY MOUTH DAILY AT BEDTIME, Disp: 90 tablet, Rfl: 3    oxybutynin (DITROPAN) 5 mg tablet, Take 5 mg by mouth 2 (two) times a day, Disp: , Rfl:     pantoprazole (PROTONIX) 40 mg tablet, TAKE 1 TABLET BY MOUTH TWICE A DAY, Disp: 60 tablet, Rfl: 3    sucralfate (CARAFATE) 1 g/10 mL suspension, Take 10 mL (1 g total) by mouth 4 (four) times a day, Disp: 828 mL, Rfl: 2    Trintellix 5 MG tablet, 5 mg daily at bedtime, Disp: , Rfl:     No Known Allergies    Objective:  Vitals:    08/05/22 1309   BP: 121/78   Pulse: 65       Left Shoulder Exam     Tenderness   Left shoulder tenderness location: Anterior and lateral     Range of Motion   Active abduction: 70   External rotation: 60   Internal rotation 0 degrees: L3     Muscle Strength   Left shoulder normal muscle strength: External rotation: 4-/5  Abduction: 3/5   Internal rotation: 4/5   External rotation: 4/5     Tests   Drop arm: positive    Other   Erythema: absent  Scars: absent  Sensation: normal  Pulse: present     Comments:  Empty can test: Positive            Physical Exam  Vitals reviewed  HENT:      Head: Normocephalic and atraumatic  Eyes:      General:         Right eye: No discharge  Left eye: No discharge  Conjunctiva/sclera: Conjunctivae normal       Pupils: Pupils are equal, round, and reactive to light  Cardiovascular:      Rate and Rhythm: Normal rate  Pulmonary:      Effort: Pulmonary effort is normal  No respiratory distress  Musculoskeletal:      Cervical back: Normal range of motion and neck supple  Comments: As noted in HPI   Skin:     General: Skin is warm and dry  Neurological:      Mental Status: She is alert and oriented to person, place, and time  I have personally reviewed pertinent films in PACS and my interpretation is as follows:    X-rays of the left shoulder obtained today demonstrates no evidence of recurrence of calcific tendinitis  No acute fractures demonstrated  No obvious lytic or blastic lesions demonstrated

## 2022-08-15 NOTE — TELEPHONE ENCOUNTER
Called and spoke to [address] rep he stated that the auth needed additional clinicals faxed over awaiting the determination

## 2022-08-16 DIAGNOSIS — K21.00 GASTROESOPHAGEAL REFLUX DISEASE WITH ESOPHAGITIS WITHOUT HEMORRHAGE: ICD-10-CM

## 2022-08-16 NOTE — TELEPHONE ENCOUNTER
Received the PA Approval via [facility] for the Pantoprazole 40 mg twice a day     Auth/Case Number:  [id number]    Date Range:  08/16/2022 til 08/16/2023    Called and relayed approval to [facility] they are filling the medication and contacting the patient when ready for      Co-pay for med is $4

## 2022-08-17 RX ORDER — PANTOPRAZOLE SODIUM 40 MG/1
TABLET, DELAYED RELEASE ORAL
Qty: 180 TABLET | Refills: 2 | Status: SHIPPED | OUTPATIENT
Start: 2022-08-17 | End: 2022-10-10 | Stop reason: SDUPTHER

## 2022-08-23 ENCOUNTER — HOSPITAL ENCOUNTER (OUTPATIENT)
Dept: RADIOLOGY | Facility: HOSPITAL | Age: 57
Discharge: HOME | End: 2022-08-23
Payer: COMMERCIAL

## 2022-08-23 DIAGNOSIS — M25.512 LEFT SHOULDER PAIN, UNSPECIFIED CHRONICITY: ICD-10-CM

## 2022-08-23 DIAGNOSIS — M24.812 INTERNAL DERANGEMENT OF LEFT SHOULDER: ICD-10-CM

## 2022-08-23 PROCEDURE — 73221 MRI JOINT UPR EXTREM W/O DYE: CPT

## 2022-08-23 PROCEDURE — G1004 CDSM NDSC: HCPCS

## 2022-09-02 ENCOUNTER — CONSULT (OUTPATIENT)
Dept: OBGYN CLINIC | Facility: CLINIC | Age: 57
End: 2022-09-02
Payer: COMMERCIAL

## 2022-09-02 VITALS
RESPIRATION RATE: 19 BRPM | DIASTOLIC BLOOD PRESSURE: 75 MMHG | SYSTOLIC BLOOD PRESSURE: 124 MMHG | TEMPERATURE: 98.2 F | WEIGHT: 228 LBS | HEIGHT: 70 IN | HEART RATE: 81 BPM | BODY MASS INDEX: 32.64 KG/M2

## 2022-09-02 DIAGNOSIS — M75.32 CALCIFIC TENDINITIS OF LEFT SHOULDER: Primary | ICD-10-CM

## 2022-09-02 PROCEDURE — 99214 OFFICE O/P EST MOD 30 MIN: CPT | Performed by: ORTHOPAEDIC SURGERY

## 2022-09-02 NOTE — PROGRESS NOTES
Assessment/Plan:  1  Calcific tendinitis of left shoulder  US msk guidance     Scribe Attestation    I,:  Karina Lees am acting as a scribe while in the presence of the attending physician :       I,:  Kenny Iqbal MD personally performed the services described in this documentation    as scribed in my presence :         Mi upon examination and review the MRI as well as previous x-rays of the left shoulder does demonstrate a recurrence of calcific tendinitis  There is a moderate-sized calcific density at the distal end of the supraspinatus tendon  This does correlate with her clinical exam with significant weakness and dysfunction with forward flexion and abduction  She is also quite weak in these planes today  I did have a lengthy discussion with her today in regards to further delineation of care  I did note that she could consider a 2nd ultrasound-guided lavage aspiration to remove the calcific density  I did note that if this does fail to provide her with symptomatic relief or she has recurrence of dysfunction  This would indicate the need for a left shoulder arthroscopy with excision of calcific density and subsequent rotator cuff repair  Mi verbalized understanding and was amenable to undergoing a 2nd lavage aspiration of the left shoulder  I did place a referral to Interventional Radiology  My office will also help facilitate having the lavage aspiration scheduled  I would like her to follow up with me 3 weeks after the lavage aspiration for clinical examination  Subjective:   Moisés Armendariz is a 56 y o  female who presents today for follow-up of her left shoulder  She unfortunately has had recurrence of painful symptoms her shoulder that has been ongoing over the past 8 weeks  She denies a traumatic injury that has resulted in her painful symptoms  She states that the pain is quite severe and limits her range of motion and ability to reach overhead    She describes as moderate to severe sharp pain that can radiate into the upper arm  She states that she is relatively asymptomatic with the arm close to her body  She states that rotational movements were also quite painful  She denies any distal paresthesias  She has been treated previously with an ultrasound-guided lavage aspiration provided by Interventional Radiology  This was performed on 3/15/2022  This did provide her with significant symptomatic relief and improvement with her function  She did have an MRI of the left shoulder completed to be reviewed today  Review of Systems   Constitutional: Negative for chills, fever and unexpected weight change  HENT: Negative for hearing loss, nosebleeds and sore throat  Eyes: Negative for pain, redness and visual disturbance  Respiratory: Negative for cough, shortness of breath and wheezing  Cardiovascular: Negative for chest pain, palpitations and leg swelling  Gastrointestinal: Negative for abdominal pain, nausea and vomiting  Endocrine: Negative for polydipsia and polyuria  Genitourinary: Negative for dysuria and hematuria  Musculoskeletal: Positive for arthralgias and myalgias  See HPI   Skin: Negative for rash and wound  Neurological: Negative for dizziness, numbness and headaches  Psychiatric/Behavioral: Negative for decreased concentration and suicidal ideas  The patient is not nervous/anxious            Past Medical History:   Diagnosis Date    Acid reflux     Anxiety     Arrhythmia     supraventricular    Bradyarrhythmia     Chest pain     when supine    Cholecystitis     De Quervain's disease (tenosynovitis)     Esophagitis     GERD (gastroesophageal reflux disease)     Osteoarthritis     PONV (postoperative nausea and vomiting)     Urinary frequency     Wears glasses        Past Surgical History:   Procedure Laterality Date    ATRIAL ABLATION SURGERY      av cath ablation an node    BACK SURGERY      lower back fusion w/cage    BUNIONECTOMY Bilateral     CERVICAL DISC SURGERY      4 screws-replaced disc-C-3    CHOLECYSTECTOMY      EGD      HAND SURGERY Right     wrist-release of tendon    LUMBAR FUSION      OR ANKLE SCOPE,PLANTAR FASCIOTOMY Right 08/30/2016    Procedure: RELEASE FASCIA PLANTAR/FASCIOTOMY ENDOSCOPIC (EPF); Surgeon: Julia Andersen DPM;  Location: [facility];  Service: Podiatry    SHOULDER ADHESION RELEASE Left     calcium deposit-US treatment    TENDON REPAIR      WISDOM TOOTH EXTRACTION         Family History   Problem Relation Age of Onset    COPD Mother     Osteoporosis Mother     Other Mother         lung transplant-COPD, emphysema    Heart disease Father     Coronary artery disease Father     Other Father         MRSA infection    Scoliosis Sister     Cancer Maternal Uncle        Social History     Occupational History    Not on file   Tobacco Use    Smoking status: Former Smoker     Packs/day: 1 00     Years: 15 00     Pack years: 15 00     Types: Cigarettes     Quit date: 5/4/2007     Years since quitting: 15 3    Smokeless tobacco: Never Used    Tobacco comment: quit 13 years ago   Substance and Sexual Activity    Alcohol use:  Yes     Alcohol/week: 0 0 standard drinks     Comment: 1x monthly    Drug use: No    Sexual activity: Yes     Partners: Male         Current Outpatient Medications:     Calcium Carbonate-Vit D-Min (CALCIUM 1200) 3516-7909 MG-UNIT CHEW, Chew daily after dinner, Disp: , Rfl:     Dexilant 60 MG capsule, Take 1 capsule by mouth every morning, Disp: , Rfl:     famotidine (PEPCID) 40 MG tablet, TAKE 1 TABLET BY MOUTH DAILY AT BEDTIME, Disp: 90 tablet, Rfl: 3    oxybutynin (DITROPAN) 5 mg tablet, Take 5 mg by mouth 2 (two) times a day, Disp: , Rfl:     pantoprazole (PROTONIX) 40 mg tablet, TAKE 1 TABLET BY MOUTH TWICE A DAY, Disp: 180 tablet, Rfl: 2    sucralfate (CARAFATE) 1 g/10 mL suspension, Take 10 mL (1 g total) by mouth 4 (four) times a day, Disp: 828 mL, Rfl: 2    Trintellix 5 MG tablet, 5 mg daily at bedtime, Disp: , Rfl:     No Known Allergies    Objective:  Vitals:    09/02/22 1249   BP: 124/75   Pulse: 81   Resp: 19   Temp: 98 2 °F (36 8 °C)       Left Shoulder Exam     Tenderness   Left shoulder tenderness location: greater tuberosity  Range of Motion   Active abduction: 90   External rotation: 50     Muscle Strength   Abduction: 3/5   Internal rotation: 5/5   External rotation: 5/5     Tests   Drop arm: positive    Other   Erythema: absent  Scars: absent  Sensation: normal  Pulse: present             Physical Exam  Vitals reviewed  HENT:      Head: Normocephalic and atraumatic  Eyes:      General:         Right eye: No discharge  Left eye: No discharge  Conjunctiva/sclera: Conjunctivae normal       Pupils: Pupils are equal, round, and reactive to light  Cardiovascular:      Rate and Rhythm: Normal rate  Pulmonary:      Effort: Pulmonary effort is normal  No respiratory distress  Musculoskeletal:      Cervical back: Normal range of motion and neck supple  Comments: As noted in HPI   Skin:     General: Skin is warm and dry  Neurological:      Mental Status: She is alert and oriented to person, place, and time  I have personally reviewed pertinent films in PACS and my interpretation is as follows:    X-rays of the left shoulder obtained on 8/5/2022 does demonstrate evidence of calcific tendinitis adjacent to the greater tuberosity  The calcific densities appear to be smaller when compared to previous films of the left shoulder  MRI of the left shoulder obtained on 8/23/2022 demonstrates recurrence of calcific tendinitis a calcific density measuring 1 2 x 0 4 cm at the distal supraspinatus tendon  No evidence of full-thickness rotator cuff tear observed  This document was created using speech voice recognition software     Grammatical errors, random word insertions, pronoun errors, and incomplete sentences are an occasional consequence of this system due to software limitations, ambient noise, and hardware issues  Any formal questions or concerns about content, text, or information contained within the body of this dictation should be directly addressed to the provider for clarification

## 2022-09-26 ENCOUNTER — ANESTHESIA (OUTPATIENT)
Dept: ANESTHESIOLOGY | Facility: HOSPITAL | Age: 57
End: 2022-09-26

## 2022-09-26 ENCOUNTER — ANESTHESIA EVENT (OUTPATIENT)
Dept: ANESTHESIOLOGY | Facility: HOSPITAL | Age: 57
End: 2022-09-26

## 2022-10-06 NOTE — NURSING NOTE
Call placed to patient to discuss upcoming appointment at [place] [address] radiology department and complete consultation with patient  Patient is having  left calcific tendinitis lavage  procedure utilizing US guidance  Reviewed patient's allergies, no current anticoagulant medication present per patient, also discussed procedure in some detail, as well as the post procedure care instructions  Patient did have the procedure done a few months back at [facility]  Patient was instructed that pain may get worse for the first few days until the subacromial bursa steroid injection takes affect  Patient was instructed that tylenol or a non-steriodal anti-inflammatory medication may be taken to assist with the pain  Informed patient that the limb must be rested for 48 hours and to avoid heavy lifting for 2 weeks (weight no more than a gallon of milk 5 lbs)  Physiotherapy can be resumed in a week  Instructed patient to keep the site of procedure dry and clean for 4-6 hours and may remove bandage in 4-6 hours  Reminded patient of location and time expected for procedure, Patient expressed understanding by verbalizing and repeating instructions

## 2022-10-10 ENCOUNTER — ANESTHESIA (OUTPATIENT)
Dept: GASTROENTEROLOGY | Facility: AMBULARY SURGERY CENTER | Age: 57
End: 2022-10-10

## 2022-10-10 ENCOUNTER — ANESTHESIA EVENT (OUTPATIENT)
Dept: GASTROENTEROLOGY | Facility: AMBULARY SURGERY CENTER | Age: 57
End: 2022-10-10

## 2022-10-10 ENCOUNTER — HOSPITAL ENCOUNTER (OUTPATIENT)
Dept: GASTROENTEROLOGY | Facility: AMBULARY SURGERY CENTER | Age: 57
Setting detail: OUTPATIENT SURGERY
Discharge: HOME | End: 2022-10-10
Attending: INTERNAL MEDICINE
Payer: COMMERCIAL

## 2022-10-10 VITALS
BODY MASS INDEX: 31.92 KG/M2 | RESPIRATION RATE: 21 BRPM | HEART RATE: 67 BPM | WEIGHT: 223 LBS | DIASTOLIC BLOOD PRESSURE: 72 MMHG | SYSTOLIC BLOOD PRESSURE: 121 MMHG | OXYGEN SATURATION: 96 % | TEMPERATURE: 97 F | HEIGHT: 70 IN

## 2022-10-10 DIAGNOSIS — K21.00 GASTROESOPHAGEAL REFLUX DISEASE WITH ESOPHAGITIS WITHOUT HEMORRHAGE: ICD-10-CM

## 2022-10-10 PROBLEM — M79.605 LEFT LEG PAIN: Status: ACTIVE | Noted: 2017-12-08

## 2022-10-10 PROBLEM — S93.402A LEFT ANKLE SPRAIN: Status: ACTIVE | Noted: 2017-12-08

## 2022-10-10 PROBLEM — S80.12XA CONTUSION OF LEFT LEG: Status: ACTIVE | Noted: 2017-12-08

## 2022-10-10 PROCEDURE — 88305 TISSUE EXAM BY PATHOLOGIST: CPT | Performed by: PATHOLOGY

## 2022-10-10 PROCEDURE — 43239 EGD BIOPSY SINGLE/MULTIPLE: CPT | Performed by: INTERNAL MEDICINE

## 2022-10-10 RX ORDER — PANTOPRAZOLE SODIUM 40 MG/1
40 TABLET, DELAYED RELEASE ORAL 2 TIMES DAILY
Qty: 180 TABLET | Refills: 3 | Status: SHIPPED | OUTPATIENT
Start: 2022-10-10

## 2022-10-10 RX ORDER — LIDOCAINE HYDROCHLORIDE 10 MG/ML
INJECTION, SOLUTION EPIDURAL; INFILTRATION; INTRACAUDAL; PERINEURAL AS NEEDED
Status: DISCONTINUED | OUTPATIENT
Start: 2022-10-10 | End: 2022-10-10

## 2022-10-10 RX ORDER — GLYCOPYRROLATE 0.2 MG/ML
INJECTION INTRAMUSCULAR; INTRAVENOUS AS NEEDED
Status: DISCONTINUED | OUTPATIENT
Start: 2022-10-10 | End: 2022-10-10

## 2022-10-10 RX ORDER — SODIUM CHLORIDE, SODIUM LACTATE, POTASSIUM CHLORIDE, CALCIUM CHLORIDE 600; 310; 30; 20 MG/100ML; MG/100ML; MG/100ML; MG/100ML
INJECTION, SOLUTION INTRAVENOUS CONTINUOUS PRN
Status: DISCONTINUED | OUTPATIENT
Start: 2022-10-10 | End: 2022-10-10

## 2022-10-10 RX ORDER — PROPOFOL 10 MG/ML
INJECTION, EMULSION INTRAVENOUS AS NEEDED
Status: DISCONTINUED | OUTPATIENT
Start: 2022-10-10 | End: 2022-10-10

## 2022-10-10 RX ADMIN — GLYCOPYRROLATE 0.1 MG: 0.2 INJECTION, SOLUTION INTRAMUSCULAR; INTRAVENOUS at 11:26

## 2022-10-10 RX ADMIN — SODIUM CHLORIDE, SODIUM LACTATE, POTASSIUM CHLORIDE, AND CALCIUM CHLORIDE: .6; .31; .03; .02 INJECTION, SOLUTION INTRAVENOUS at 11:23

## 2022-10-10 RX ADMIN — LIDOCAINE HYDROCHLORIDE 100 MG: 10 INJECTION, SOLUTION EPIDURAL; INFILTRATION; INTRACAUDAL at 11:26

## 2022-10-10 RX ADMIN — PROPOFOL 120 MG: 10 INJECTION, EMULSION INTRAVENOUS at 11:26

## 2022-10-10 RX ADMIN — PROPOFOL 30 MG: 10 INJECTION, EMULSION INTRAVENOUS at 11:31

## 2022-10-10 RX ADMIN — PROPOFOL 30 MG: 10 INJECTION, EMULSION INTRAVENOUS at 11:28

## 2022-10-10 NOTE — H&P
History and Physical - [facility]  Stephan Murguia 56 y o  female MRN: [id number]    HPI: Stephan Murguia is a 56y o  year old female who presents with GERD/EoE  Review of Systems    Historical Information   Past Medical History:   Diagnosis Date   • Acid reflux    • Anxiety    • Arrhythmia     supraventricular   • Bradyarrhythmia    • Chest pain     when supine   • Cholecystitis    • De Quervain's disease (tenosynovitis)    • Esophagitis    • GERD (gastroesophageal reflux disease)    • Osteoarthritis    • PONV (postoperative nausea and vomiting)    • Urinary frequency    • Wears glasses      Past Surgical History:   Procedure Laterality Date   • ATRIAL ABLATION SURGERY      av cath ablation an node   • BACK SURGERY      lower back fusion w/cage   • BUNIONECTOMY Bilateral    • CERVICAL DISC SURGERY      4 screws-replaced disc-C-3   • CHOLECYSTECTOMY     • EGD     • HAND SURGERY Right     wrist-release of tendon   • LUMBAR FUSION     • NE ANKLE SCOPE,PLANTAR FASCIOTOMY Right 08/30/2016    Procedure: RELEASE FASCIA PLANTAR/FASCIOTOMY ENDOSCOPIC (EPF);   Surgeon: Sally Solo DPM;  Location: [facility];  Service: Podiatry   • SHOULDER ADHESION RELEASE Left     calcium deposit-US treatment   • TENDON REPAIR     • WISDOM TOOTH EXTRACTION       Social History   Social History     Substance and Sexual Activity   Alcohol Use Yes   • Alcohol/week: 0 0 standard drinks    Comment: 1x monthly     Social History     Substance and Sexual Activity   Drug Use No     Social History     Tobacco Use   Smoking Status Former Smoker   • Packs/day: 1 00   • Years: 15 00   • Pack years: 15 00   • Types: Cigarettes   • Quit date: 5/4/2007   • Years since quitting: 15 4   Smokeless Tobacco Never Used   Tobacco Comment    quit 13 years ago     Family History   Problem Relation Age of Onset   • COPD Mother    • Osteoporosis Mother    • Other Mother         lung transplant-COPD, emphysema   • Heart disease Father • Coronary artery disease Father    • Other Father         MRSA infection   • Scoliosis Sister    • Cancer Maternal Uncle    • Esophageal cancer Maternal Aunt        Meds/Allergies     (Not in a hospital admission)      No Known Allergies    Objective     /63   Pulse (!) 53   Temp (!) 97 °F (36 1 °C) (Temporal)   Resp 16   Ht 5' 10" (1 778 m)   Wt 101 kg (223 lb)   LMP 12/11/2017   SpO2 99%   BMI 32 00 kg/m²       PHYSICAL EXAM    Gen: NAD  CV: RRR  CHEST: Clear  ABD: soft, NT/ND  EXT: no edema  Neuro: AAO      ASSESSMENT/PLAN:  This is a 56y o  year old female here for GERD/EoE       PLAN:   Procedure: Abbott Stai

## 2022-10-10 NOTE — ANESTHESIA POSTPROCEDURE EVALUATION
Post-Op Assessment Note    CV Status:  Stable  Pain Score: 0    Pain management: adequate     Mental Status:  Alert and awake   Hydration Status:  Stable   PONV Controlled:  None   Airway Patency:  Patent      Post Op Vitals Reviewed: Yes      Staff: CRNA         No complications documented      /64 (10/10/22 1137)    Temp (!) 96 9 °F (36 1 °C) (10/10/22 1137)    Pulse 69 (10/10/22 1137)   Resp 16 (10/10/22 1137)    SpO2 97 % (10/10/22 1137)

## 2022-10-10 NOTE — DISCHARGE INSTRUCTIONS
Upper Endoscopy   WHAT YOU NEED TO KNOW:   An upper endoscopy is also called an upper gastrointestinal (GI) endoscopy, or an esophagogastroduodenoscopy (EGD)  It is a procedure to examine the inside of your esophagus, stomach, and duodenum (first part of the small intestine) with a scope  You may feel bloated, gassy, or have some abdominal discomfort after your procedure  Your throat may be sore for 24 to 36 hours  You may burp or pass gas from air that is still inside your body  DISCHARGE INSTRUCTIONS:   Seek care immediately if:   You have sudden, severe abdominal pain  You have problems swallowing  You have a large amount of black, sticky bowel movements or blood in your bowel movements  You have sudden trouble breathing  You feel weak, lightheaded, or faint or your heart beats faster than normal for you  Contact your healthcare provider if:   You have a fever and chills  You have nausea or are vomiting  Your abdomen is bloated or feels full and hard  You have abdominal pain  You have black, sticky bowel movements or blood in your bowel movements  You have not had a bowel movement for 3 days after your procedure  You have rash or hives  You have questions or concerns about your procedure  Activity:   Do not lift, strain, or run for 24 hours after your procedure  Rest after your procedure  You have been given medicine to relax you  Do not drive or make important decisions until the day after your procedure  Return to your normal activity as directed  Relieve gas and discomfort from bloating by lying on your right side with a heating pad on your abdomen  You may need to take short walks to help the gas move out  Eat small meals until bloating is relieved  Follow up with your healthcare provider as directed: Write down your questions so you remember to ask them during your visits       If you take a “blood thinner”, please review the specific instructions from your endoscopist about when you should resume it  These can be found in the “Recommendation” and “Your Medication list” sections of this After Visit Summary

## 2022-10-10 NOTE — ANESTHESIA PREPROCEDURE EVALUATION
Procedure:  EGD    Relevant Problems   ANESTHESIA   (+) PONV (postoperative nausea and vomiting)      CARDIO   (+) Atypical chest pain   (+) Bradyarrhythmia   (+) Supraventricular tachycardia (HCC)      GI/HEPATIC   (+) GERD (gastroesophageal reflux disease)      MUSCULOSKELETAL   (+) Localized primary osteoarthrosis of carpometacarpal joint of right wrist   (+) Osteoarthritis   (+) Patellar tendinitis of left knee      NEURO/PSYCH   (+) Anxiety      Musculoskeletal and Integument   (+) Patellofemoral syndrome of left knee   (+) Plantar fascial fibromatosis of right foot      Other   (+) Class 1 obesity in adult      Lab Results   Component Value Date     01/20/2014    SODIUM 143 12/18/2020    K 3 5 12/18/2020     (H) 12/18/2020    CO2 28 12/18/2020    ANIONGAP 8 01/20/2014    AGAP 6 12/18/2020    BUN 19 12/18/2020    CREATININE 0 89 12/18/2020    GLUC 70 12/18/2020    CALCIUM 10 1 12/18/2020    AST 15 12/18/2020    ALT 41 12/18/2020    ALKPHOS 95 12/18/2020    PROT 7 1 01/20/2014    TP 7 5 12/18/2020    BILITOT 0 49 01/20/2014    TBILI 0 79 12/18/2020    EGFR 74 12/18/2020     Lab Results   Component Value Date    WBC 6 78 12/18/2020    HGB 14 8 12/18/2020    HCT 45 5 12/18/2020    MCV 90 12/18/2020     12/18/2020       Physical Exam    Airway    Mallampati score: II  TM Distance: >3 FB  Neck ROM: full     Dental   No notable dental hx     Cardiovascular      Pulmonary      Other Findings        Anesthesia Plan  ASA Score- 2     Anesthesia Type- IV sedation with anesthesia with ASA Monitors  Additional Monitors:   Airway Plan:           Plan Factors-Exercise tolerance (METS): >4 METS  Chart reviewed  EKG reviewed  Imaging results reviewed  Existing labs reviewed  Patient summary reviewed  Induction- intravenous  Postoperative Plan-     Informed Consent- Anesthetic plan and risks discussed with patient  I personally reviewed this patient with the CRNA   Discussed and agreed on the Anesthesia Plan with the MAURICE Ewing

## 2022-10-11 ENCOUNTER — HOSPITAL ENCOUNTER (OUTPATIENT)
Dept: RADIOLOGY | Facility: HOSPITAL | Age: 57
Discharge: HOME | End: 2022-10-11
Attending: ORTHOPAEDIC SURGERY
Payer: COMMERCIAL

## 2022-10-11 DIAGNOSIS — M75.32 CALCIFIC TENDINITIS OF LEFT SHOULDER: ICD-10-CM

## 2022-10-11 PROCEDURE — 20611 DRAIN/INJ JOINT/BURSA W/US: CPT

## 2022-10-11 RX ORDER — BUPIVACAINE HYDROCHLORIDE 2.5 MG/ML
2 INJECTION, SOLUTION EPIDURAL; INFILTRATION; INTRACAUDAL ONCE
Status: COMPLETED | OUTPATIENT
Start: 2022-10-11 | End: 2022-10-11

## 2022-10-11 RX ORDER — LIDOCAINE HYDROCHLORIDE 10 MG/ML
10 INJECTION, SOLUTION EPIDURAL; INFILTRATION; INTRACAUDAL; PERINEURAL ONCE
Status: COMPLETED | OUTPATIENT
Start: 2022-10-11 | End: 2022-10-11

## 2022-10-11 RX ORDER — SODIUM CHLORIDE 9 MG/ML
10 INJECTION INTRAVENOUS ONCE AS NEEDED
Status: COMPLETED | OUTPATIENT
Start: 2022-10-11 | End: 2022-10-11

## 2022-10-11 RX ORDER — METHYLPREDNISOLONE ACETATE 80 MG/ML
80 INJECTION, SUSPENSION INTRA-ARTICULAR; INTRALESIONAL; INTRAMUSCULAR; SOFT TISSUE ONCE
Status: COMPLETED | OUTPATIENT
Start: 2022-10-11 | End: 2022-10-11

## 2022-10-11 RX ADMIN — METHYLPREDNISOLONE ACETATE 80 MG: 80 INJECTION, SUSPENSION INTRA-ARTICULAR; INTRALESIONAL; INTRAMUSCULAR; SOFT TISSUE at 14:52

## 2022-10-11 RX ADMIN — SODIUM CHLORIDE 10 ML: 9 INJECTION, SOLUTION INTRAMUSCULAR; INTRAVENOUS; SUBCUTANEOUS at 14:52

## 2022-10-11 RX ADMIN — LIDOCAINE HYDROCHLORIDE 10 ML: 10 INJECTION, SOLUTION EPIDURAL; INFILTRATION; INTRACAUDAL; PERINEURAL at 14:52

## 2022-10-11 RX ADMIN — BUPIVACAINE HYDROCHLORIDE 2 ML: 2.5 INJECTION, SOLUTION EPIDURAL; INFILTRATION; INTRACAUDAL; PERINEURAL at 14:52

## 2022-10-19 PROCEDURE — 88305 TISSUE EXAM BY PATHOLOGIST: CPT | Performed by: PATHOLOGY

## 2022-10-21 ENCOUNTER — TELEPHONE (OUTPATIENT)
Dept: GASTROENTEROLOGY | Facility: AMBULARY SURGERY CENTER | Age: 57
End: 2022-10-21

## 2022-10-21 NOTE — TELEPHONE ENCOUNTER
----- Message from Kristina Rodriguez LPN sent at [id number]  2:12 PM EDT -----    ----- Message -----  From: Chavez Flores MD  Sent: 10/20/2022   9:00 AM EDT  To: [facility]    Biopsies from recent endoscopy showed mild inflammation from acid reflux but no concerning findings which is good news    Please continue current medications, follow-up in the office in 3 to 4 months with right PAs    Call with any questions or concerns

## 2022-11-23 DIAGNOSIS — K21.9 GASTROESOPHAGEAL REFLUX DISEASE WITHOUT ESOPHAGITIS: ICD-10-CM

## 2022-11-23 RX ORDER — SUCRALFATE ORAL 1 G/10ML
1 SUSPENSION ORAL 4 TIMES DAILY
Qty: 840 ML | Refills: 2 | Status: SHIPPED | OUTPATIENT
Start: 2022-11-23

## 2023-01-26 ENCOUNTER — OFFICE VISIT (OUTPATIENT)
Dept: GASTROENTEROLOGY | Facility: AMBULARY SURGERY CENTER | Age: 58
End: 2023-01-26

## 2023-01-26 VITALS
WEIGHT: 219 LBS | DIASTOLIC BLOOD PRESSURE: 68 MMHG | HEART RATE: 70 BPM | HEIGHT: 70 IN | SYSTOLIC BLOOD PRESSURE: 124 MMHG | BODY MASS INDEX: 31.35 KG/M2 | OXYGEN SATURATION: 98 %

## 2023-01-26 DIAGNOSIS — Z12.11 COLON CANCER SCREENING: ICD-10-CM

## 2023-01-26 DIAGNOSIS — K21.00 GASTROESOPHAGEAL REFLUX DISEASE WITH ESOPHAGITIS WITHOUT HEMORRHAGE: Primary | ICD-10-CM

## 2023-01-26 NOTE — PROGRESS NOTES
Assessment and Plan    #1  Refractory GERD: patient is taking dexilant 60 mg daily, pantoprazole 40 mg BID, carafate BID, and pepcid 40 mg nightly and is still having flare of GERD symptoms  EGD showed esophagitis in June 2022, repeated in October 2022 with improvement, no signs of EOE at that time, no dysphagia    -discussed that she should not be on dexilant and pantoprazole together, will d/c dexilant  -continue PPI BID  -increase carafate to 3-4 times daily as needed for symptoms  -continue pepcid at bedtime  -will need further evaluation with pH/manometry study which was discussed in detail with the patient, may need to be considered for reflux corrective surgery  -continue antireflux diet and measures  -discussed that increased stress and change in diet with eating a lot of hospital foods while mother was admitted in early January likely led to worsening of symptoms  #2  Colon cancer screening: reports colonoscopy in 2015 at [facility] was normal and recommended repeat in 10 years  -will get records  -repeat colonoscopy in 2025     Follow up in a few months after pH/manometry  --------------------------------------------------------------------------------------------------------------------    Chief Complaint: f/u GERD    HPI: Robert Cuellar is a 56 y o  female with a history of refractory GERD, De Quervain's tenosynovitis, anxiety who presents today for follow up  Patient was previously seen for acid reflux symptoms and underwent EGD twice last year  Original EGD showed severe esophagitis with increased eosinophils and was concerning for severe reflux versus EOE  Repeat endoscopy after being on PPI twice daily was notable for improvement of the esophagitis and decrease in eosinophils  Of note the patient has been taking Dexilant for several years and has been taking pantoprazole twice daily in addition to this  She is also taking Pepcid before bedtime as well as Carafate twice daily    She reports following a GERD diet and avoiding trigger foods  She tries not to eat late  Denies any alcohol use  Denies any excessive NSAID use  She reports that recently her mother was hospitalized for extended period of time at the end of December into early January and she was spending a lot of time at the hospital and eating hospital food which she believes may have triggered her symptoms in addition to the stress  She denies any significant dysphagia symptoms  She reports epigastric abdominal pain as well as burning in the throat  Her symptoms tend to be a lot worse when she is lying flat and she will get substernal chest pain with this  She reports losing approximately 10 pounds over the last few months secondary to eating last   She does report that her appetite is okay though  Reports that her bowels are typically pretty regular  She does report that not too long ago she was at a family reunion and developed diarrhea after eating chicken and potatoes  She reports that since that her bowels have been a little off but they are not significant diarrhea anymore  Denies any blood in the stool or black tarry stool  Her last colonoscopy was in 2015 at [facility] she reports that it was normal and was recommended to repeat in 10 years  Review of Systems:   General: negative for fatigue, fever, night sweats; +weight loss  Psychological: negative for anxiety or depression  Ophthalmic: negative for blurry vision or scleral icterus  ENT: negative for headaches, oral lesions, sore throat, vocal changes or dysphagia  Hematological and Lymphatic: negative for pallor or swollen lymph nodes  Respiratory: negative for cough, shortness of breath or wheezing  Cardiovascular: negative for edema or murmur;  Chest pain  Gastrointestinal: as mentioned in HPI  Genito-Urinary: negative for dysuria or incontinence  Musculoskeletal: negative for joint stiffness or joint swelling; R knee pain   Dermatological: negative for pruritus, rash, or jaundice    Current Medications  Current Outpatient Medications   Medication Sig Dispense Refill   • Calcium Carbonate-Vit D-Min (CALCIUM 1200) 9243-6837 MG-UNIT CHEW Chew daily after dinner     • Dexilant 60 MG capsule Take 1 capsule by mouth every morning     • famotidine (PEPCID) 40 MG tablet TAKE 1 TABLET BY MOUTH DAILY AT BEDTIME 90 tablet 3   • oxybutynin (DITROPAN) 5 mg tablet Take 5 mg by mouth 2 (two) times a day     • pantoprazole (PROTONIX) 40 mg tablet Take 1 tablet (40 mg total) by mouth 2 (two) times a day 180 tablet 3   • sucralfate (CARAFATE) 1 g/10 mL suspension TAKE 10 ML (1 G TOTAL) BY MOUTH 4 (FOUR) TIMES A  mL 2   • Trintellix 5 MG tablet 5 mg daily at bedtime       No current facility-administered medications for this visit  Past Medical History  Past Medical History:   Diagnosis Date   • Acid reflux    • Anxiety    • Arrhythmia     supraventricular   • Bradyarrhythmia    • Chest pain     when supine   • Cholecystitis    • De Quervain's disease (tenosynovitis)    • Esophagitis    • GERD (gastroesophageal reflux disease)    • Osteoarthritis    • PONV (postoperative nausea and vomiting)    • Urinary frequency    • Wears glasses        Past Surgical History  Past Surgical History:   Procedure Laterality Date   • ATRIAL ABLATION SURGERY      av cath ablation an node   • BACK SURGERY      lower back fusion w/cage   • BUNIONECTOMY Bilateral    • CERVICAL DISC SURGERY      4 screws-replaced disc-C-3   • CHOLECYSTECTOMY     • EGD     • HAND SURGERY Right     wrist-release of tendon   • LUMBAR FUSION     • DE ENDOSCOPIC PLANTAR FASCIOTOMY Right 08/30/2016    Procedure: RELEASE FASCIA PLANTAR/FASCIOTOMY ENDOSCOPIC (EPF);   Surgeon: Jef Brock DPM;  Location: [facility];  Service: Podiatry   • SHOULDER ADHESION RELEASE Left     calcium deposit-US treatment   • TENDON REPAIR     • UPPER GASTROINTESTINAL ENDOSCOPY     • WISDOM TOOTH EXTRACTION         Past Social History Social History     Socioeconomic History   • Marital status:      Spouse name: None   • Number of children: None   • Years of education: None   • Highest education level: None   Occupational History   • None   Tobacco Use   • Smoking status: Former     Packs/day: 1 00     Years: 15 00     Pack years: 15 00     Types: Cigarettes     Quit date: 5/4/2007     Years since quitting: 15 7   • Smokeless tobacco: Never   • Tobacco comments:     quit 13 years ago   Vaping Use   • Vaping Use: Never used   Substance and Sexual Activity   • Alcohol use:  Yes     Alcohol/week: 0 0 standard drinks     Comment: 1x monthly   • Drug use: No   • Sexual activity: Yes     Partners: Male   Other Topics Concern   • None   Social History Narrative   • None     Social Determinants of Health     Financial Resource Strain: Not on file   Food Insecurity: Not on file   Transportation Needs: Not on file   Physical Activity: Not on file   Stress: Not on file   Social Connections: Not on file   Intimate Partner Violence: Not on file   Housing Stability: Not on file       The following portions of the patient's history were reviewed and updated as appropriate: allergies, current medications, past family history, past medical history, past social history, past surgical history and problem list     Vital Signs  Vitals:    01/26/23 0842   BP: 124/68   BP Location: Right arm   Patient Position: Sitting   Cuff Size: Standard   Pulse: 70   SpO2: 98%   Weight: 99 3 kg (219 lb)   Height: 5' 10" (1 778 m)       Physical Exam:  General appearance: alert, cooperative, no distress  HEENT: normocephalic, anicteric, no eye erythema or discharge, no oropharyngeal thrush  Neck: supple, trachea midline, no adenopathy  Lungs: CTA b/l, no rales, rhonchi, or wheezing, unlabored respirations  Heart: RRR, no murmur, rubs, or gallops  Abdomen: soft, non-tender, non-distended, normal bowel sounds, no masses or organomegaly  Rectal: deferred  Extremities: no cyanosis, clubbing, or edema  Musculoskeletal: normal gait  Skin: color and texture normal, no jaundice, no rashes or lesions  Psychiatric: alert and oriented, normal affect and behavior

## 2023-02-01 DIAGNOSIS — K21.9 GASTROESOPHAGEAL REFLUX DISEASE WITHOUT ESOPHAGITIS: ICD-10-CM

## 2023-02-01 RX ORDER — SUCRALFATE ORAL 1 G/10ML
SUSPENSION ORAL
Qty: 840 ML | Refills: 2 | Status: SHIPPED | OUTPATIENT
Start: 2023-02-01

## 2023-03-13 ENCOUNTER — HOSPITAL ENCOUNTER (OUTPATIENT)
Dept: GASTROENTEROLOGY | Facility: HOSPITAL | Age: 58
Discharge: HOME | End: 2023-03-13

## 2023-03-13 VITALS
HEART RATE: 58 BPM | DIASTOLIC BLOOD PRESSURE: 66 MMHG | RESPIRATION RATE: 16 BRPM | TEMPERATURE: 97.6 F | SYSTOLIC BLOOD PRESSURE: 115 MMHG | OXYGEN SATURATION: 98 %

## 2023-03-13 DIAGNOSIS — K21.00 GASTROESOPHAGEAL REFLUX DISEASE WITH ESOPHAGITIS WITHOUT HEMORRHAGE: ICD-10-CM

## 2023-03-13 NOTE — PERIOPERATIVE NURSING NOTE
Patient brought in the room and explained the esophageal manometry procedure  After the confirmation of allergies, lidocaine 2% inserted via right /left nostril and  a transnasal insertion of the High Resolution esophageal manometry catheter was inserted via right nostril  Patient given water to drink during the insertion and once the catheter inserted pressure bands of both Upper esophageal sphincter  (UES) and Lower esophageal sphincter ( LES) were observed on the color contour  Patient instructed to take a deep breath to verify placement of the catheter, diaphragmatic pinch noted on inspiration  Catheter was secured to right cheek  Patient was assisted to supine position   Patient was instructed to relax  while acclimating the catheter for about 5 minutes  A 30 second baseline resting pressure was obtained to identify the UES and LES followed by a series of 10 liquid swallows using 5 cc room temperature normal saline to assess esophageal motility and bolus transit  No10 viscous swallows done, 1 multiple rapid drink swallow using 2 cc room temperature normal saline given a total of 5 drinks  Patient instructed to sit up at the edge of the stretcher and given 5 upright liquid swallows using 5 cc room temperature normal saline and 1 rapid drink challenge using 200 cc room temperature water  At the end of the procedure the high resolution esophageal manometry catheter was removed from the nostril intact  Dual sensor PH probe inserted via right nostril and secured  Zephr recorder teachback performed and patient verbalized understanding  Patient instructed to return next day to have probe remove  Discharge instructions given and patient ambulated out of room in stable condition

## 2023-03-28 ENCOUNTER — TELEPHONE (OUTPATIENT)
Dept: GASTROENTEROLOGY | Facility: CLINIC | Age: 58
End: 2023-03-28

## 2023-03-28 ENCOUNTER — TELEPHONE (OUTPATIENT)
Dept: OTHER | Facility: OTHER | Age: 58
End: 2023-03-28

## 2023-03-28 NOTE — TELEPHONE ENCOUNTER
Patient has been filling Rx for Pantoprazole and Dexilant  Both drugs are in the same class  Please follow up with patient to let her know if she needs to be on both medications

## 2023-03-28 NOTE — TELEPHONE ENCOUNTER
Spoke with Colette Calderon pharmacist, pt should only be on pantoprazole as stated in [address] 1/26  Will call patient

## 2023-03-28 NOTE — TELEPHONE ENCOUNTER
Patients GI provider:  SHERLEY Ruelas    Number to return call: [phone]    Reason for call: Pt calling stating she received a letter from her insurance informing her that she needs a peer to peer review from graciela Hodges she had done 3/13/2023      Scheduled procedure/appointment date if applicable: Appt: 2/63/4373

## 2023-03-29 NOTE — TELEPHONE ENCOUNTER
Spoke with patient, she is only taking pantoprazole and has stopped dexilant  She knows not to take both PPI  Patient also received letter from insurance company denying esophageal manometry/ pH study she had done on 3/13  She uploaded document through [employer], she call insurance and they are requiring a prior Zoila John   Advised will send message to [facility] team

## 2023-04-06 NOTE — TELEPHONE ENCOUNTER
Called and spoke to patient she sent in the denial paper will reach out to the insurance to verify and confirm since the [address] [facility]  stated there is no auth needed       [id number] [address] L[address]

## 2023-05-02 ENCOUNTER — OFFICE VISIT (OUTPATIENT)
Dept: GASTROENTEROLOGY | Facility: AMBULARY SURGERY CENTER | Age: 58
End: 2023-05-02

## 2023-05-02 VITALS
WEIGHT: 212 LBS | BODY MASS INDEX: 30.35 KG/M2 | HEIGHT: 70 IN | SYSTOLIC BLOOD PRESSURE: 120 MMHG | HEART RATE: 68 BPM | OXYGEN SATURATION: 99 % | DIASTOLIC BLOOD PRESSURE: 86 MMHG

## 2023-05-02 DIAGNOSIS — K21.9 GASTROESOPHAGEAL REFLUX DISEASE WITHOUT ESOPHAGITIS: Primary | ICD-10-CM

## 2023-05-02 RX ORDER — SUCRALFATE ORAL 1 G/10ML
1 SUSPENSION ORAL
Qty: 840 ML | Refills: 2 | Status: SHIPPED | OUTPATIENT
Start: 2023-05-02

## 2023-05-02 RX ORDER — FAMOTIDINE 40 MG/1
40 TABLET, FILM COATED ORAL 2 TIMES DAILY
Qty: 180 TABLET | Refills: 3 | Status: SHIPPED | OUTPATIENT
Start: 2023-05-02

## 2023-05-02 RX ORDER — AMITRIPTYLINE HYDROCHLORIDE 10 MG/1
10 TABLET, FILM COATED ORAL
Qty: 30 TABLET | Refills: 2 | Status: SHIPPED | OUTPATIENT
Start: 2023-05-02

## 2023-05-02 NOTE — PROGRESS NOTES
[address] [facility]  Lula Pennington 66 y o  female MRN: [id number]            Assessment & Plan:  Pleasant 53-year-old female here for follow-up of GERD and atypical chest pain  1   GERD: Strong suspect she is a component of visceral hypersensitivity given her significant symptoms despite minimal acid reflux exposure on recent 24-hour pH study  Additionally she has minimal acid reflux changes on endoscopic evaluation  -Continue pantoprazole 40 mg twice daily  -We will increase famotidine to 40 mg twice daily  -I suggest that she may want to try taking Carafate as needed, though she notes it has been quite helpful  -Additionally we will start her on amitriptyline 10 mg at night, discussed with her risks of mild side effects including dry mouth, dry eyes, constipation   -We can increase dose as tolerated  -She was asked to send us a MyChart update in 3 to 4 weeks to monitor her symptoms  -At this time I also recommend she can take Carafate as well as Pepcid Complete as needed  -I do not think she needs to have reflux corrective procedure currently, we will continue to reevaluate    2  Colon cancer screening, she will be due for colonoscopy [address] was seen today for gerd  Diagnoses and all orders for this visit:    Gastroesophageal reflux disease without esophagitis  -     amitriptyline (ELAVIL) 10 mg tablet; Take 1 tablet (10 mg total) by mouth daily at bedtime  -     famotidine (PEPCID) 40 MG tablet; Take 1 tablet (40 mg total) by mouth 2 (two) times a day  -     sucralfate (CARAFATE) 1 g/10 mL suspension; Take 10 mL (1 g total) by mouth 4 (four) times a day (with meals and at bedtime)            _____________________________________________________________        CC: Follow-up of GERD    HPI:  Lula Pennington is a 66 y  o female who is here for follow-up of GERD  Pleasant 53-year-old female initially presented with symptoms of reflux, significant chest pain    Endoscopic evaluation similarly showed whole lot of acid reflux changes  Patient has been on medication regimen, repeat endoscopy showed complete resolution of any minimal esophagitis  She had a 24-hour pH study which demonstrated 1 7% reflux in the upright position, also it was mild acid  She had been taking PPI therapy twice a day at the time of procedure as well as H2 blocker in the evening and Carafate  She reports that generally speaking she is doing relatively well  She has symptoms of significant chest pain only a few days per week  She notes that she has throat burning about once per week and breakthrough reflux about 3 times per week  When she gets a significant chest discomfort she takes an extra dose of Pepcid complete that seems to resolve her symptoms  She denies any nausea, vomiting, dysphagia  Has occasionally loose stools  Weight has been stable  She tried to modify her diet, avoiding chocolates, coffee, ice cream, any kind of acidic foods  Compared to when she first presented she reports that she is doing much better but still having occasional breakthrough symptoms despite optimizing her medications as noted above  She is caring for her mother who is status post lung transplant, recent pacemaker placement    ROS:  The remainder of the ROS was negative except for the pertinent positives mentioned in HPI  Allergies: Patient has no known allergies      Medications:   Current Outpatient Medications:     amitriptyline (ELAVIL) 10 mg tablet, Take 1 tablet (10 mg total) by mouth daily at bedtime, Disp: 30 tablet, Rfl: 2    Calcium Carbonate-Vit D-Min (CALCIUM 1200) 9699-2048 MG-UNIT CHEW, Chew daily after dinner, Disp: , Rfl:     famotidine (PEPCID) 40 MG tablet, Take 1 tablet (40 mg total) by mouth 2 (two) times a day, Disp: 180 tablet, Rfl: 3    oxybutynin (DITROPAN) 5 mg tablet, Take 5 mg by mouth 2 (two) times a day, Disp: , Rfl:     pantoprazole (PROTONIX) 40 mg tablet, Take 1 tablet (40 mg total) by mouth 2 (two) times a day, Disp: 180 tablet, Rfl: 3    sucralfate (CARAFATE) 1 g/10 mL suspension, Take 10 mL (1 g total) by mouth 4 (four) times a day (with meals and at bedtime), Disp: 840 mL, Rfl: 2    Trintellix 5 MG tablet, 5 mg daily at bedtime, Disp: , Rfl:     Past Medical History:   Diagnosis Date    Acid reflux     Anxiety     Arrhythmia     supraventricular    Bradyarrhythmia     Chest pain     when supine    Cholecystitis     De Quervain's disease (tenosynovitis)     Esophagitis     GERD (gastroesophageal reflux disease)     Osteoarthritis     PONV (postoperative nausea and vomiting)     Urinary frequency     Wears glasses        Past Surgical History:   Procedure Laterality Date    ATRIAL ABLATION SURGERY      av cath ablation an node    BACK SURGERY      lower back fusion w/cage    BUNIONECTOMY Bilateral     CERVICAL DISC SURGERY      4 screws-replaced disc-C-3    CHOLECYSTECTOMY      EGD      HAND SURGERY Right     wrist-release of tendon    LUMBAR FUSION      MT ENDOSCOPIC PLANTAR FASCIOTOMY Right 08/30/2016    Procedure: RELEASE FASCIA PLANTAR/FASCIOTOMY ENDOSCOPIC (EPF); Surgeon: Jennifer Sanchez DPM;  Location: [facility];  Service: Podiatry    SHOULDER ADHESION RELEASE Left     calcium deposit-US treatment    TENDON REPAIR      UPPER GASTROINTESTINAL ENDOSCOPY      WISDOM TOOTH EXTRACTION         Family History   Problem Relation Age of Onset    COPD Mother     Osteoporosis Mother     Other Mother         lung transplant-COPD, emphysema    Heart disease Father     Coronary artery disease Father     Other Father         MRSA infection    Scoliosis Sister     Cancer Maternal Uncle     Esophageal cancer Maternal Aunt         reports that she quit smoking about 16 years ago  Her smoking use included cigarettes  She has a 15 00 pack-year smoking history  She has never used smokeless tobacco  She reports current alcohol use   She reports that she does not use "drugs        Physical Exam:    /86 (BP Location: Right arm, Patient Position: Sitting, Cuff Size: Standard)   Pulse 68   Ht 5' 10\" (1 778 m)   Wt 96 2 kg (212 lb)   LMP 12/11/2017   SpO2 99%   BMI 30 42 kg/m²     Gen: wn/wd, NAD, healthy-appearing female  HEENT: anicteric, MMM, no cervical LAD  CVS: RRR, no m/r/g  CHEST: CTA b/l  ABD: +BS, soft, NT,ND, no hepatosplenomegaly  EXT: no c/c/e  NEURO: aaox3  SKIN: NO rashes    "

## 2023-05-02 NOTE — PATIENT INSTRUCTIONS
Take pantoprazoel twice daily  Famotodine twice daily daily  Carafate today and tomorrow  Pepcid complete or carafate as needed  Amitryptiline at night  Send me mychart update in 3-4 weeks

## 2023-05-02 NOTE — LETTER
May 2, 2023     MD Phyllis Pedraza 30  [address]     Patient: Morgan Pop   YOB: 1965   Date of Visit: 5/2/2023       Dear Dr Kierra Watson: Thank you for referring Rober Terrell to me for evaluation  Below are my notes for this consultation  If you have questions, please do not hesitate to call me  I look forward to following your patient along with you  Sincerely,        Harry Zimmer MD        CC: No Recipients  Harry Zimmer MD  5/2/2023 11:09 AM  Sign when Signing Visit  [address] [facility]  Morgan Pop 66 y o  female MRN: [id number]            Assessment & Plan:  Pleasant 62-year-old female here for follow-up of GERD and atypical chest pain  1   GERD: Strong suspect she is a component of visceral hypersensitivity given her significant symptoms despite minimal acid reflux exposure on recent 24-hour pH study  Additionally she has minimal acid reflux changes on endoscopic evaluation  -Continue pantoprazole 40 mg twice daily  -We will increase famotidine to 40 mg twice daily  -I suggest that she may want to try taking Carafate as needed, though she notes it has been quite helpful  -Additionally we will start her on amitriptyline 10 mg at night, discussed with her risks of mild side effects including dry mouth, dry eyes, constipation   -We can increase dose as tolerated  -She was asked to send us a MyChart update in 3 to 4 weeks to monitor her symptoms  -At this time I also recommend she can take Carafate as well as Pepcid Complete as needed  -I do not think she needs to have reflux corrective procedure currently, we will continue to reevaluate    2  Colon cancer screening, she will be due for colonoscopy [address] was seen today for gerd  Diagnoses and all orders for this visit:    Gastroesophageal reflux disease without esophagitis  -     amitriptyline (ELAVIL) 10 mg tablet;  Take 1 tablet (10 mg total) by mouth daily at bedtime  -     famotidine (PEPCID) 40 MG tablet; Take 1 tablet (40 mg total) by mouth 2 (two) times a day  -     sucralfate (CARAFATE) 1 g/10 mL suspension; Take 10 mL (1 g total) by mouth 4 (four) times a day (with meals and at bedtime)            _____________________________________________________________        CC: Follow-up of GERD    HPI:  Hollis Thorpe is a 66 y  o female who is here for follow-up of GERD  Pleasant 42-year-old female initially presented with symptoms of reflux, significant chest pain  Endoscopic evaluation similarly showed whole lot of acid reflux changes  Patient has been on medication regimen, repeat endoscopy showed complete resolution of any minimal esophagitis  She had a 24-hour pH study which demonstrated 1 7% reflux in the upright position, also it was mild acid  She had been taking PPI therapy twice a day at the time of procedure as well as H2 blocker in the evening and Carafate  She reports that generally speaking she is doing relatively well  She has symptoms of significant chest pain only a few days per week  She notes that she has throat burning about once per week and breakthrough reflux about 3 times per week  When she gets a significant chest discomfort she takes an extra dose of Pepcid complete that seems to resolve her symptoms  She denies any nausea, vomiting, dysphagia  Has occasionally loose stools  Weight has been stable  She tried to modify her diet, avoiding chocolates, coffee, ice cream, any kind of acidic foods  Compared to when she first presented she reports that she is doing much better but still having occasional breakthrough symptoms despite optimizing her medications as noted above  She is caring for her mother who is status post lung transplant, recent pacemaker placement    ROS:  The remainder of the ROS was negative except for the pertinent positives mentioned in HPI        Allergies: Patient has no known allergies  Medications:   Current Outpatient Medications:     amitriptyline (ELAVIL) 10 mg tablet, Take 1 tablet (10 mg total) by mouth daily at bedtime, Disp: 30 tablet, Rfl: 2    Calcium Carbonate-Vit D-Min (CALCIUM 1200) 6817-8936 MG-UNIT CHEW, Chew daily after dinner, Disp: , Rfl:     famotidine (PEPCID) 40 MG tablet, Take 1 tablet (40 mg total) by mouth 2 (two) times a day, Disp: 180 tablet, Rfl: 3    oxybutynin (DITROPAN) 5 mg tablet, Take 5 mg by mouth 2 (two) times a day, Disp: , Rfl:     pantoprazole (PROTONIX) 40 mg tablet, Take 1 tablet (40 mg total) by mouth 2 (two) times a day, Disp: 180 tablet, Rfl: 3    sucralfate (CARAFATE) 1 g/10 mL suspension, Take 10 mL (1 g total) by mouth 4 (four) times a day (with meals and at bedtime), Disp: 840 mL, Rfl: 2    Trintellix 5 MG tablet, 5 mg daily at bedtime, Disp: , Rfl:     Past Medical History:   Diagnosis Date    Acid reflux     Anxiety     Arrhythmia     supraventricular    Bradyarrhythmia     Chest pain     when supine    Cholecystitis     De Quervain's disease (tenosynovitis)     Esophagitis     GERD (gastroesophageal reflux disease)     Osteoarthritis     PONV (postoperative nausea and vomiting)     Urinary frequency     Wears glasses        Past Surgical History:   Procedure Laterality Date    ATRIAL ABLATION SURGERY      av cath ablation an node    BACK SURGERY      lower back fusion w/cage    BUNIONECTOMY Bilateral     CERVICAL DISC SURGERY      4 screws-replaced disc-C-3    CHOLECYSTECTOMY      EGD      HAND SURGERY Right     wrist-release of tendon    LUMBAR FUSION      MN ENDOSCOPIC PLANTAR FASCIOTOMY Right 08/30/2016    Procedure: RELEASE FASCIA PLANTAR/FASCIOTOMY ENDOSCOPIC (EPF);   Surgeon: Mariela Pickard DPM;  Location: [facility];  Service: Podiatry    SHOULDER ADHESION RELEASE Left     calcium deposit-US treatment    TENDON REPAIR      UPPER GASTROINTESTINAL ENDOSCOPY      WISDOM TOOTH EXTRACTION         Family "History   Problem Relation Age of Onset   Yvette Burns COPD Mother     Osteoporosis Mother     Other Mother         lung transplant-COPD, emphysema    Heart disease Father     Coronary artery disease Father     Other Father         MRSA infection    Scoliosis Sister     Cancer Maternal Uncle     Esophageal cancer Maternal Aunt         reports that she quit smoking about 16 years ago  Her smoking use included cigarettes  She has a 15 00 pack-year smoking history  She has never used smokeless tobacco  She reports current alcohol use  She reports that she does not use drugs        Physical Exam:    /86 (BP Location: Right arm, Patient Position: Sitting, Cuff Size: Standard)   Pulse 68   Ht 5' 10\" (1 778 m)   Wt 96 2 kg (212 lb)   LMP 12/11/2017   SpO2 99%   BMI 30 42 kg/m²     Gen: wn/wd, NAD, healthy-appearing female  HEENT: anicteric, MMM, no cervical LAD  CVS: RRR, no m/r/g  CHEST: CTA b/l  ABD: +BS, soft, NT,ND, no hepatosplenomegaly  EXT: no c/c/e  NEURO: aaox3  SKIN: NO rashes      "

## 2023-05-12 ENCOUNTER — APPOINTMENT (EMERGENCY)
Dept: CT IMAGING | Facility: HOSPITAL | Age: 58
End: 2023-05-12

## 2023-05-12 ENCOUNTER — HOSPITAL ENCOUNTER (EMERGENCY)
Facility: HOSPITAL | Age: 58
Discharge: HOME | End: 2023-05-12
Attending: EMERGENCY MEDICINE

## 2023-05-12 ENCOUNTER — APPOINTMENT (EMERGENCY)
Dept: RADIOLOGY | Facility: HOSPITAL | Age: 58
End: 2023-05-12

## 2023-05-12 VITALS
SYSTOLIC BLOOD PRESSURE: 120 MMHG | TEMPERATURE: 98.2 F | HEART RATE: 44 BPM | OXYGEN SATURATION: 100 % | RESPIRATION RATE: 16 BRPM | DIASTOLIC BLOOD PRESSURE: 65 MMHG

## 2023-05-12 DIAGNOSIS — S93.402A LEFT ANKLE SPRAIN: Primary | ICD-10-CM

## 2023-05-12 DIAGNOSIS — S20.212A RIB CONTUSION, LEFT, INITIAL ENCOUNTER: ICD-10-CM

## 2023-05-12 DIAGNOSIS — S20.229A BACK CONTUSION: ICD-10-CM

## 2023-05-12 LAB
BILIRUB UR QL STRIP: NEGATIVE
CLARITY UR: CLEAR
COLOR UR: COLORLESS
GLUCOSE UR STRIP-MCNC: NEGATIVE MG/DL
HGB UR QL STRIP.AUTO: NEGATIVE
KETONES UR STRIP-MCNC: NEGATIVE MG/DL
LEUKOCYTE ESTERASE UR QL STRIP: NEGATIVE
NITRITE UR QL STRIP: NEGATIVE
PH UR STRIP.AUTO: 5.5 [PH]
PROT UR STRIP-MCNC: NEGATIVE MG/DL
SP GR UR STRIP.AUTO: 1.01 (ref 1–1.03)
UROBILINOGEN UR STRIP-ACNC: <2 MG/DL

## 2023-05-12 RX ORDER — NAPROXEN 500 MG/1
500 TABLET ORAL 2 TIMES DAILY WITH MEALS
Qty: 14 TABLET | Refills: 0 | Status: SHIPPED | OUTPATIENT
Start: 2023-05-12 | End: 2023-05-19

## 2023-05-12 RX ORDER — LIDOCAINE 50 MG/G
1 PATCH TOPICAL ONCE
Status: DISCONTINUED | OUTPATIENT
Start: 2023-05-12 | End: 2023-05-12 | Stop reason: HOSPADM

## 2023-05-12 RX ORDER — METHOCARBAMOL 500 MG/1
500 TABLET, FILM COATED ORAL 2 TIMES DAILY PRN
Qty: 14 TABLET | Refills: 0 | Status: SHIPPED | OUTPATIENT
Start: 2023-05-12 | End: 2023-05-19

## 2023-05-12 RX ORDER — ACETAMINOPHEN 325 MG/1
975 TABLET ORAL ONCE
Status: COMPLETED | OUTPATIENT
Start: 2023-05-12 | End: 2023-05-12

## 2023-05-12 RX ORDER — KETOROLAC TROMETHAMINE 30 MG/ML
30 INJECTION, SOLUTION INTRAMUSCULAR; INTRAVENOUS ONCE
Status: COMPLETED | OUTPATIENT
Start: 2023-05-12 | End: 2023-05-12

## 2023-05-12 RX ADMIN — LIDOCAINE 1 PATCH: 50 PATCH CUTANEOUS at 10:24

## 2023-05-12 RX ADMIN — KETOROLAC TROMETHAMINE 30 MG: 30 INJECTION, SOLUTION INTRAMUSCULAR; INTRAVENOUS at 10:24

## 2023-05-12 RX ADMIN — ACETAMINOPHEN 975 MG: 325 TABLET ORAL at 10:20

## 2023-05-13 NOTE — ED PROVIDER NOTES
History  Chief Complaint   Patient presents with   • Fall     Pt reports slip in shower this morning c/o left ankle pain and left lower back pain, hurts to take a deep breathe, denies headstrike/thinners/loc     65 yo F coming in for a slip and fall in the shower this morning, landing on her left lower back and twisting her left ankle  Denies head strike, no anticoagulant use, no LOC   used: No    Fall      Prior to Admission Medications   Prescriptions Last Dose Informant Patient Reported? Taking?    Calcium Carbonate-Vit D-Min (CALCIUM 1200) 4617-2824 MG-UNIT CHEW   Yes No   Sig: Chew daily after dinner   Trintellix 5 MG tablet   Yes No   Si mg daily at bedtime   amitriptyline (ELAVIL) 10 mg tablet   No No   Sig: Take 1 tablet (10 mg total) by mouth daily at bedtime   famotidine (PEPCID) 40 MG tablet   No No   Sig: Take 1 tablet (40 mg total) by mouth 2 (two) times a day   oxybutynin (DITROPAN) 5 mg tablet   Yes No   Sig: Take 5 mg by mouth 2 (two) times a day   pantoprazole (PROTONIX) 40 mg tablet   No No   Sig: Take 1 tablet (40 mg total) by mouth 2 (two) times a day   sucralfate (CARAFATE) 1 g/10 mL suspension   No No   Sig: Take 10 mL (1 g total) by mouth 4 (four) times a day (with meals and at bedtime)      Facility-Administered Medications: None       Past Medical History:   Diagnosis Date   • Acid reflux    • Anxiety    • Arrhythmia     supraventricular   • Bradyarrhythmia    • Chest pain     when supine   • Cholecystitis    • De Quervain's disease (tenosynovitis)    • Esophagitis    • GERD (gastroesophageal reflux disease)    • Osteoarthritis    • PONV (postoperative nausea and vomiting)    • Urinary frequency    • Wears glasses        Past Surgical History:   Procedure Laterality Date   • ATRIAL ABLATION SURGERY      av cath ablation an node   • BACK SURGERY      lower back fusion w/cage   • BUNIONECTOMY Bilateral    • CERVICAL DISC SURGERY      4 screws-replaced disc-C-3   • CHOLECYSTECTOMY     • EGD     • HAND SURGERY Right     wrist-release of tendon   • LUMBAR FUSION     • NM ENDOSCOPIC PLANTAR FASCIOTOMY Right 2016    Procedure: RELEASE FASCIA PLANTAR/FASCIOTOMY ENDOSCOPIC (EPF); Surgeon: Francisco Escobar DPM;  Location: [facility];  Service: Podiatry   • SHOULDER ADHESION RELEASE Left     calcium deposit-US treatment   • TENDON REPAIR     • UPPER GASTROINTESTINAL ENDOSCOPY     • WISDOM TOOTH EXTRACTION         Family History   Problem Relation Age of Onset   • COPD Mother    • Osteoporosis Mother    • Other Mother         lung transplant-COPD, emphysema   • Heart disease Father    • Coronary artery disease Father    • Other Father         MRSA infection   • Scoliosis Sister    • Cancer Maternal Uncle    • Esophageal cancer Maternal Aunt      I have reviewed and agree with the history as documented  E-Cigarette/Vaping   • E-Cigarette Use Never User      E-Cigarette/Vaping Substances     Social History     Tobacco Use   • Smoking status: Former     Packs/day: 1      Years: 15      Pack years: 15      Types: Cigarettes     Quit date: 2007     Years since quittin 0   • Smokeless tobacco: Never   • Tobacco comments:     quit 13 years ago   Vaping Use   • Vaping Use: Never used   Substance Use Topics   • Alcohol use: Yes     Alcohol/week: 0 0 standard drinks     Comment: 1x monthly   • Drug use: No       Review of Systems   All other systems reviewed and are negative  Physical Exam  Physical Exam  Vitals and nursing note reviewed  Constitutional:       General: She is not in acute distress  Appearance: Normal appearance  She is well-developed and normal weight  She is not ill-appearing, toxic-appearing or diaphoretic  HENT:      Head: Normocephalic and atraumatic  Right Ear: External ear normal       Left Ear: External ear normal       Nose: Nose normal       Mouth/Throat:      Mouth: Mucous membranes are moist       Pharynx: Oropharynx is clear  Eyes:      Extraocular Movements: Extraocular movements intact  Conjunctiva/sclera: Conjunctivae normal       Pupils: Pupils are equal, round, and reactive to light  Cardiovascular:      Rate and Rhythm: Normal rate and regular rhythm  Pulses: Normal pulses  Heart sounds: Normal heart sounds  Pulmonary:      Effort: Pulmonary effort is normal  No respiratory distress  Breath sounds: Normal breath sounds  Abdominal:      General: Abdomen is flat  There is no distension  Palpations: Abdomen is soft  Tenderness: There is no abdominal tenderness  Musculoskeletal:         General: No swelling, deformity or signs of injury  Normal range of motion  Cervical back: Normal range of motion and neck supple  No rigidity or tenderness  No muscular tenderness  Comments: Left ankle: mild swelling and tenderness to the lateral ankle, lat malleolus and ATF ligaments  Normal ROM actively, with some mild discomfort  No deformities  NVI distally  Back: tenderness to the left lower paraspinal lumbar region and lower ribs posteriorly  No bruising or stepoffs, or crepitus  Skin:     General: Skin is warm and dry  Capillary Refill: Capillary refill takes less than 2 seconds  Neurological:      General: No focal deficit present  Mental Status: She is alert and oriented to person, place, and time  Mental status is at baseline     Psychiatric:         Mood and Affect: Mood normal          Behavior: Behavior normal          Vital Signs  ED Triage Vitals   Temperature Pulse Respirations Blood Pressure SpO2   05/12/23 0922 05/12/23 0922 05/12/23 0922 05/12/23 0922 05/12/23 0922   98 2 °F (36 8 °C) (!) 54 18 140/75 99 %      Temp Source Heart Rate Source Patient Position - Orthostatic VS BP Location FiO2 (%)   05/12/23 0922 05/12/23 0922 05/12/23 0922 05/12/23 1113 --   Oral Monitor Sitting Right arm       Pain Score       05/12/23 0922       6           Vitals:    05/12/23 0597 05/12/23 1113   BP: 140/75 120/65   Pulse: (!) 54 (!) 44   Patient Position - Orthostatic VS: Sitting Lying         Visual Acuity      ED Medications  Medications   acetaminophen (TYLENOL) tablet 975 mg (975 mg Oral Given 5/12/23 1020)   ketorolac (TORADOL) injection 30 mg (30 mg Intramuscular Given 5/12/23 1024)       Diagnostic Studies  Results Reviewed     Procedure Component Value Units Date/Time    UA (URINE) with reflex to Scope [[id number]] Collected: 05/12/23 1123    Lab Status: Final result Specimen: Urine, Clean Catch Updated: 05/12/23 1133     Color, UA Colorless     Clarity, UA Clear     Specific Gravity, UA 1 007     pH, UA 5 5     Leukocytes, UA Negative     Nitrite, UA Negative     Protein, UA Negative mg/dl      Glucose, UA Negative mg/dl      Ketones, UA Negative mg/dl      Urobilinogen, UA <2 0 mg/dl      Bilirubin, UA Negative     Occult Blood, UA Negative                 XR ankle 3+ views LEFT   ED Interpretation by Stephanie Sheriff DO (05/12 1155)   No acute abnormalities  Final Result by Lucy Joyner MD (05/12 1433)      No acute osseous abnormality  Workstation performed: [id number]         CT abdomen pelvis wo contrast   Final Result by Stephanie Harry DO (05/12 1148)      No acute intra-abdominal abnormality  No acute fractures  Mild splenomegaly  Limited study without IV or oral contrast             Workstation performed: [id number]         CT recon only lumbar spine   Final Result by Stephanie Harry DO (05/12 1157)      No fracture or traumatic subluxation  Anterior fusion L5-S1 with plate and screws again noted  Fracture of the S1 screws evident bilaterally, a chronic finding (seen on previous abdominal CT in retrospect) with incorporated intervertebral bone graft evident        Workstation performed: [id number]                    Procedures  Procedures         ED Course                                             Medical Decision Making  65 yo w/ slip and fall, L back and ankle pain  CT negative for rib and lumbar spinal fractures  Shows chronic changes 2/2 hardware in L spine  Xray of left ankle neg for fractures  Will treat for sprain, place in air splint, f/u ortho provided, offered crutches but pt has some at home and comfortable walking until she gets home  Stable for d/c home, all questions answered  Back contusion: acute illness or injury  Left ankle sprain: acute illness or injury  Rib contusion, left, initial encounter: acute illness or injury  Amount and/or Complexity of Data Reviewed  Labs: ordered  Radiology: ordered and independent interpretation performed  Risk  OTC drugs  Prescription drug management  Disposition  Final diagnoses:   Left ankle sprain   Back contusion   Rib contusion, left, initial encounter     Time reflects when diagnosis was documented in both MDM as applicable and the Disposition within this note     Time User Action Codes Description Comment    5/12/2023 11:53 AM Cyrus Magallanes Add [S93 402A] Left ankle sprain     5/12/2023  8:46 PM Cyrus Magallanes Add [S20 229A] Back contusion     5/12/2023  8:47 PM Purnell Cockayne, Eunice Decree Add [S20 212A] Rib contusion, left, initial encounter       ED Disposition     ED Disposition   Discharge    Condition   Stable    Date/Time   Fri May 12, 2023 11:53 AM    Comment   Bradly Keita discharge to home/self care                 Follow-up Information     Follow up With Specialties Details Why Contact Info Additional Irene Muir MD [facility] 30  [address]  [address] Specialists [facility]   0 [facility] [phone]  [address] [place] [address] Specialists [facility]ue[facility], [address]essa [address] [place], [place], [address] [facility]          Discharge Medication List as of 5/12/2023 11:55 AM      START taking these medications    Details   methocarbamol (ROBAXIN) 500 mg tablet Take 1 tablet (500 mg total) by mouth 2 (two) times a day as needed for muscle spasms for up to 7 days, Starting Fri 5/12/2023, Until Fri 5/19/2023 at 2359, Normal      naproxen (EC NAPROSYN) 500 MG EC tablet Take 1 tablet (500 mg total) by mouth 2 (two) times a day with meals for 7 days, Starting Fri 5/12/2023, Until Fri 5/19/2023, Normal         CONTINUE these medications which have NOT CHANGED    Details   amitriptyline (ELAVIL) 10 mg tablet Take 1 tablet (10 mg total) by mouth daily at bedtime, Starting Tue 5/2/2023, Normal      Calcium Carbonate-Vit D-Min (CALCIUM 1200) 9801-5233 MG-UNIT CHEW Chew daily after dinner, Historical Med      famotidine (PEPCID) 40 MG tablet Take 1 tablet (40 mg total) by mouth 2 (two) times a day, Starting Tue 5/2/2023, Normal      oxybutynin (DITROPAN) 5 mg tablet Take 5 mg by mouth 2 (two) times a day, Starting Fri 2/5/2021, Historical Med      pantoprazole (PROTONIX) 40 mg tablet Take 1 tablet (40 mg total) by mouth 2 (two) times a day, Starting Mon 10/10/2022, Normal      sucralfate (CARAFATE) 1 g/10 mL suspension Take 10 mL (1 g total) by mouth 4 (four) times a day (with meals and at bedtime), Starting Tue 5/2/2023, Normal      Trintellix 5 MG tablet 5 mg daily at bedtime, Starting Sat 2/6/2021, Historical Med             No discharge procedures on file      PDMP Review     None          ED Provider  Electronically Signed by           Andrew Sim DO  05/12/23 2048

## 2023-07-03 DIAGNOSIS — K21.9 GASTROESOPHAGEAL REFLUX DISEASE WITHOUT ESOPHAGITIS: ICD-10-CM

## 2023-07-03 RX ORDER — SUCRALFATE ORAL 1 G/10ML
1 SUSPENSION ORAL
Qty: 840 ML | Refills: 2 | Status: SHIPPED | OUTPATIENT
Start: 2023-07-03

## 2023-08-02 ENCOUNTER — TELEPHONE (OUTPATIENT)
Dept: GASTROENTEROLOGY | Facility: AMBULARY SURGERY CENTER | Age: 58
End: 2023-08-02

## 2023-08-02 ENCOUNTER — OFFICE VISIT (OUTPATIENT)
Dept: GASTROENTEROLOGY | Facility: AMBULARY SURGERY CENTER | Age: 58
End: 2023-08-02
Payer: COMMERCIAL

## 2023-08-02 VITALS
SYSTOLIC BLOOD PRESSURE: 128 MMHG | HEART RATE: 57 BPM | WEIGHT: 209.8 LBS | BODY MASS INDEX: 30.03 KG/M2 | HEIGHT: 70 IN | OXYGEN SATURATION: 98 % | DIASTOLIC BLOOD PRESSURE: 84 MMHG

## 2023-08-02 DIAGNOSIS — K21.9 GASTROESOPHAGEAL REFLUX DISEASE WITHOUT ESOPHAGITIS: ICD-10-CM

## 2023-08-02 DIAGNOSIS — K21.00 GASTROESOPHAGEAL REFLUX DISEASE WITH ESOPHAGITIS WITHOUT HEMORRHAGE: ICD-10-CM

## 2023-08-02 PROCEDURE — 99214 OFFICE O/P EST MOD 30 MIN: CPT | Performed by: PHYSICIAN ASSISTANT

## 2023-08-02 RX ORDER — IBUPROFEN 800 MG/1
800 TABLET ORAL 3 TIMES DAILY
COMMUNITY
Start: 2023-07-18

## 2023-08-02 RX ORDER — SUCRALFATE ORAL 1 G/10ML
1 SUSPENSION ORAL
Qty: 840 ML | Refills: 2 | Status: SHIPPED | OUTPATIENT
Start: 2023-08-02

## 2023-08-02 RX ORDER — PANTOPRAZOLE SODIUM 40 MG/1
40 TABLET, DELAYED RELEASE ORAL 2 TIMES DAILY
Qty: 180 TABLET | Refills: 3 | Status: SHIPPED | OUTPATIENT
Start: 2023-08-02

## 2023-08-02 NOTE — PROGRESS NOTES
[facility] - Outpatient Follow-up Note  Fara Sky 66 y.o. female MRN: [id number]  Encounter: [id number]          ASSESSMENT AND PLAN:      1. GERD  Patient is doing relatively well for several months however had recurrence of symptoms last week after eating clams with butter and pulled chicken. Currently taking PPI twice daily and Pepcid once daily. She was able to come off of Carafate. She took Elavil for only a few days then stopped as she was doing well.    -Patient was doing well for several months before recurrence of symptoms last week which were likely related to specific food triggers. - We will continue Protonix 40 mg twice daily. - We will increase Pepcid to 40 mg twice a day. - Can continue to use Carafate as needed or prior to eating any foods that may cause worsening of symptoms.  - We will hold off on Elavil at this time and consider restarting in the future. - GERD handout given  -Patient received a message from the pharmacy that her Protonix twice a day would not be due until next month. I sent a message to our staff to call the pharmacy regarding this as she will shortly run out of medication. Follow-up in 3 months.  ______________________________________________________________________    SUBJECTIVE:      Fara Sky is a pleasant 26-year-old female with history of GERD, SVT who presents the office for follow-up. Patient was last seen in the office around 3 months ago for reflux symptoms. Her Protonix was increased to twice daily in addition to Pepcid twice daily and Carafate as needed. She was also started on amitriptyline at that time for component of visceral hypersensitivity. Patient reports she is relatively doing very well since last office visit and had no significant symptoms. Because of this she actually was able to stop taking Carafate.   She also did not realize to take Pepcid twice a day therefore her current regimen has been Protonix twice a day and Pepcid once daily. She did have severe symptoms last week on Friday and Saturday. This was after eating clams with butter and pulled the chicken without sauce. She reports having severe symptoms that were not relieved with Pepcid Complete. She then had diarrhea after and felt better. She reports taking Elavil for 2 days however stopped this and has not been taking this. She follows food avoidance sugars very closely. She had pH and manometry testing 3/2023. At that time she was on PPI once daily, Pepcid once daily and Carafate. Acid exposure time was minimal however she reported significant symptoms. Reported last colonoscopy in 2015 with repeat recommended in 10 years. REVIEW OF SYSTEMS IS OTHERWISE NEGATIVE. Historical Information   Past Medical History:   Diagnosis Date   • Acid reflux    • Anxiety    • Arrhythmia     supraventricular   • Bradyarrhythmia    • Chest pain     when supine   • Cholecystitis    • De Quervain's disease (tenosynovitis)    • Esophagitis    • GERD (gastroesophageal reflux disease)    • Osteoarthritis    • PONV (postoperative nausea and vomiting)    • Urinary frequency    • Wears glasses      Past Surgical History:   Procedure Laterality Date   • ATRIAL ABLATION SURGERY      av cath ablation an node   • BACK SURGERY      lower back fusion w/cage   • BUNIONECTOMY Bilateral    • CERVICAL DISC SURGERY      4 screws-replaced disc-C-3   • CHOLECYSTECTOMY     • EGD     • HAND SURGERY Right     wrist-release of tendon   • LUMBAR FUSION     • SC ENDOSCOPIC PLANTAR FASCIOTOMY Right 08/30/2016    Procedure: RELEASE FASCIA PLANTAR/FASCIOTOMY ENDOSCOPIC (EPF);   Surgeon: Helga Villalpando DPM;  Location: [facility];  Service: Podiatry   • SHOULDER ADHESION RELEASE Left     calcium deposit-US treatment   • TENDON REPAIR     • UPPER GASTROINTESTINAL ENDOSCOPY     • WISDOM TOOTH EXTRACTION       Social History   Social History     Substance and Sexual Activity   Alcohol Use Yes   • Alcohol/week: 0.0 standard drinks of alcohol    Comment: 1x monthly     Social History     Substance and Sexual Activity   Drug Use No     Social History     Tobacco Use   Smoking Status Former   • Packs/day: 1.00   • Years: 15.00   • Total pack years: 15.00   • Types: Cigarettes   • Quit date: 2007   • Years since quittin.2   Smokeless Tobacco Never   Tobacco Comments    quit 13 years ago     Family History   Problem Relation Age of Onset   • COPD Mother    • Osteoporosis Mother    • Other Mother         lung transplant-COPD, emphysema   • Heart disease Father    • Coronary artery disease Father    • Other Father         MRSA infection   • Scoliosis Sister    • Cancer Maternal Uncle    • Esophageal cancer Maternal Aunt        Meds/Allergies       Current Outpatient Medications:   •  Calcium Carbonate-Vit D-Min (CALCIUM 1200) 0009-8335 MG-UNIT CHEW  •  famotidine (PEPCID) 40 MG tablet  •  ibuprofen (MOTRIN) 800 mg tablet  •  oxybutynin (DITROPAN) 5 mg tablet  •  pantoprazole (PROTONIX) 40 mg tablet  •  sucralfate (CARAFATE) 1 g/10 mL suspension  •  amitriptyline (ELAVIL) 10 mg tablet  •  methocarbamol (ROBAXIN) 500 mg tablet  •  naproxen (EC NAPROSYN) 500 MG EC tablet  •  Trintellix 5 MG tablet    No Known Allergies        Objective     Blood pressure 128/84, pulse 57, height 5' 10" (1.778 m), weight 95.2 kg (209 lb 12.8 oz), last menstrual period 2017, SpO2 98 %, not currently breastfeeding. Body mass index is 30.1 kg/m². PHYSICAL EXAM:      General Appearance:   Alert, cooperative, no distress   HEENT:   Normocephalic, atraumatic, anicteric.     Neck:  Supple, symmetrical, trachea midline   Lungs:   Clear to auscultation bilaterally; no rales, rhonchi or wheezing; respirations unlabored    Heart[de-identified]   Regular rate and rhythm; no murmur, rub, or gallop.    Abdomen:   Soft, non-tender, non-distended; normal bowel sounds; no masses, no organomegaly    Genitalia:   Deferred    Rectal:   Deferred    Extremities:  No cyanosis, clubbing or edema    Pulses:  2+ and symmetric    Skin:  No jaundice, rashes, or lesions    Lymph nodes:  No palpable cervical lymphadenopathy        Lab Results:   No visits with results within 1 Day(s) from this visit. Latest known visit with results is:   Admission on 05/12/2023, Discharged on 05/12/2023   Component Date Value   • Color, UA 05/12/2023 Colorless    • Clarity, UA 05/12/2023 Clear    • Specific Gravity, UA 05/12/2023 1.007    • pH, UA 05/12/2023 5.5    • Leukocytes, UA 05/12/2023 Negative    • Nitrite, UA 05/12/2023 Negative    • Protein, UA 05/12/2023 Negative    • Glucose, UA 05/12/2023 Negative    • Ketones, UA 05/12/2023 Negative    • Urobilinogen, UA 05/12/2023 <2.0    • Bilirubin, UA 05/12/2023 Negative    • Occult Blood, UA 05/12/2023 Negative          Radiology Results:   No results found.

## 2023-08-02 NOTE — TELEPHONE ENCOUNTER
----- Message from Serena Piña PA-C sent at 8/2/2023  9:23 AM EDT -----  Patient received a message from [facility] that her Protonix twice a day is not due for refill until next month. Can you call the pharmacy to get clarification on this as she really should be on this medicine twice a day? Thank you!

## 2023-08-02 NOTE — TELEPHONE ENCOUNTER
----- Message from Colleen Meade PA-C sent at 8/2/2023 12:05 PM EDT -----  Thank you! Please call and inform patient what the pharmacy told you. Maybe she misplaced a bottle? If she cannot find it then my recommendation would be she would need to buy over the counter Prilosec and take 40 mg twice daily for the next month until the refill is ready. Thank you!  ----- Message -----  From: Daniel Muniz  Sent: 8/2/2023  10:13 AM EDT  To: Colleen Meade PA-C    See telephone encounter. ----- Message -----  From: Colleen Meade PA-C  Sent: 8/2/2023   9:24 AM EDT  To: [facility]    Patient received a message from [facility] that her Protonix twice a day is not due for refill until next month. Can you call the pharmacy to get clarification on this as she really should be on this medicine twice a day? Thank you!

## 2023-08-02 NOTE — TELEPHONE ENCOUNTER
I called and spoke to the patient informed her of what the pharmacy told me. I explained that her refill would not be ready till about 9/16/23. She said she has no idea where it is. I relayed the provider recommendation for the over the counter Prilosec. She said she would look for her meds and if she can't find it she will buy the prilosec.

## 2023-08-02 NOTE — TELEPHONE ENCOUNTER
Called and spoke to Pharmacist at [facility]. He said that the Pantoprazole was filled on 6/14/23 and picked up 6/16/23. She was given a 3 month supply. Due to be refilled on or about 9/16/23.

## 2023-08-21 ENCOUNTER — TELEPHONE (OUTPATIENT)
Dept: OTHER | Facility: HOSPITAL | Age: 58
End: 2023-08-21

## 2023-08-21 DIAGNOSIS — K21.00 GASTROESOPHAGEAL REFLUX DISEASE WITH ESOPHAGITIS WITHOUT HEMORRHAGE: ICD-10-CM

## 2023-08-21 RX ORDER — PANTOPRAZOLE SODIUM 40 MG/1
40 TABLET, DELAYED RELEASE ORAL 2 TIMES DAILY
Qty: 180 TABLET | Refills: 3 | OUTPATIENT
Start: 2023-08-21

## 2023-09-08 NOTE — PROGRESS NOTES
Caller Name: Kristen  Call Back Number: [phone]    Patient is requesting a referral to GYN.       [facility]        NAME: Tracy Canseco is a 49 y o  female  : 1965    MRN: [id number]  DATE: 2022  TIME: 1:33 PM    Assessment and Plan   Sprain of right knee, unspecified ligament, initial encounter Jonathan Ruiz  1  Sprain of right knee, unspecified ligament, initial encounter  predniSONE 50 mg tablet    CANCELED: XR knee 3 vw right non injury   2  Instability of right knee joint  Brace         Patient Instructions     Take med as directed   Knee brace given to patient at the clinic  Follow up with PCP in 3-5 days  Proceed to  ER if symptoms worsen  Chief Complaint     Chief Complaint   Patient presents with    Knee Pain     gave out about a week ago and again yesterday    no acute injury         History of Present Illness       HPI   Reports knee pain on the right knee  Duration 1 week  Denies injury or trauma  Says about 4 years ago she had gotten a steroid injection on same knee  Yesterday while getting out of bed, the knee gave out and she fell to the floor  Current pain level is 5/10; yesterday pain was 8/10  No radiation of the pain  Review of Systems   Review of Systems   Musculoskeletal: Positive for arthralgias (right knee)  Skin: Negative for color change, rash and wound  Neurological: Negative for weakness and numbness           Current Medications       Current Outpatient Medications:     b complex vitamins capsule, Take 1 capsule by mouth daily, Disp: , Rfl:     Calcium Carbonate-Vit D-Min (CALCIUM 1200) 7054-1960 MG-UNIT CHEW, Chew, Disp: , Rfl:     Dexilant 60 MG capsule, Take 1 capsule by mouth daily, Disp: , Rfl:     ibuprofen (MOTRIN) 200 mg tablet, Take 600 mg by mouth every 6 (six) hours as needed for mild pain, Disp: , Rfl:     oxybutynin (DITROPAN) 5 mg tablet, Take 5 mg by mouth 2 (two) times a day, Disp: , Rfl:     Trintellix 5 MG tablet, TAKE 1 TABLET BY ORAL ROUTE EVERY DAY AT THE SAME TIME EACH DAY, Disp: , Rfl:     predniSONE 50 mg tablet, Take 1 tablet (50 mg total) by mouth daily for 5 days, Disp: 5 tablet, Rfl: 0    Current Allergies     Allergies as of 02/27/2022    (No Known Allergies)            The following portions of the patient's history were reviewed and updated as appropriate: allergies, current medications, past family history, past medical history, past social history, past surgical history and problem list      Past Medical History:   Diagnosis Date    Acid reflux     Arrhythmia     supraventricular    Bradyarrhythmia     Cholecystitis     De Quervain's disease (tenosynovitis)     Osteoarthritis     PONV (postoperative nausea and vomiting)     Urinary frequency     Wears glasses        Past Surgical History:   Procedure Laterality Date    ATRIAL ABLATION SURGERY      av cath ablation an node    BACK SURGERY      BUNIONECTOMY Bilateral     CERVICAL DISC SURGERY      CHOLECYSTECTOMY      HAND SURGERY      LUMBAR FUSION      NECK SURGERY      GA ANKLE SCOPE,PLANTAR FASCIOTOMY Right 8/30/2016    Procedure: RELEASE FASCIA PLANTAR/FASCIOTOMY ENDOSCOPIC (EPF); Surgeon: Kelly Storey DPM;  Location: [facility];  Service: Podiatry    TENDON REPAIR      WISDOM TOOTH EXTRACTION      WRIST SURGERY Right        Family History   Problem Relation Age of Onset    COPD Mother     Osteoporosis Mother     Coronary artery disease Father     Cancer Maternal Uncle     Scoliosis Sister          Medications have been verified  Objective   Pulse 63   Temp 97 9 °F (36 6 °C)   Resp 18   Ht 5' 10" (1 778 m)   Wt 97 5 kg (215 lb)   SpO2 98%   BMI 30 85 kg/m²   No LMP recorded  Patient has had an implant  Physical Exam     Physical Exam  Musculoskeletal:         General: Tenderness (tenderness with palpation of the postero-medial aspect of the right knee and the naterior aspect of the knee) present  No swelling or deformity  Comments: Drawer's test is negative  ROM of the right ankle is normal  No foot-drop  Skin:     Findings: No bruising or erythema

## 2023-09-25 DIAGNOSIS — K21.00 GASTROESOPHAGEAL REFLUX DISEASE WITH ESOPHAGITIS WITHOUT HEMORRHAGE: ICD-10-CM

## 2023-09-25 RX ORDER — PANTOPRAZOLE SODIUM 40 MG/1
40 TABLET, DELAYED RELEASE ORAL 2 TIMES DAILY
Qty: 180 TABLET | Refills: 0 | Status: SHIPPED | OUTPATIENT
Start: 2023-09-25 | End: 2023-09-28 | Stop reason: SDUPTHER

## 2023-09-28 ENCOUNTER — TELEPHONE (OUTPATIENT)
Dept: GASTROENTEROLOGY | Facility: AMBULARY SURGERY CENTER | Age: 58
End: 2023-09-28

## 2023-09-28 DIAGNOSIS — K21.00 GASTROESOPHAGEAL REFLUX DISEASE WITH ESOPHAGITIS WITHOUT HEMORRHAGE: ICD-10-CM

## 2023-09-28 RX ORDER — PANTOPRAZOLE SODIUM 40 MG/1
40 TABLET, DELAYED RELEASE ORAL DAILY
Qty: 90 TABLET | Refills: 2 | Status: SHIPPED | OUTPATIENT
Start: 2023-09-28

## 2023-09-28 NOTE — TELEPHONE ENCOUNTER
Received fax from [facility] patient is requesting new script for pantoprazole. Her insurance will only cover once a day. Thank you.

## 2023-12-05 ENCOUNTER — HOSPITAL ENCOUNTER (OUTPATIENT)
Dept: NON INVASIVE DIAGNOSTICS | Facility: HOSPITAL | Age: 58
Discharge: HOME | End: 2023-12-05
Payer: COMMERCIAL

## 2023-12-05 DIAGNOSIS — I82.401 DEEP VEIN THROMBOSIS (DVT) OF RIGHT LOWER EXTREMITY, UNSPECIFIED CHRONICITY, UNSPECIFIED VEIN (HCC): ICD-10-CM

## 2023-12-05 PROCEDURE — 93971 EXTREMITY STUDY: CPT

## 2023-12-05 PROCEDURE — 93971 EXTREMITY STUDY: CPT | Performed by: SURGERY

## 2024-02-29 ENCOUNTER — OFFICE VISIT (OUTPATIENT)
Dept: GASTROENTEROLOGY | Facility: AMBULARY SURGERY CENTER | Age: 59
End: 2024-02-29
Payer: COMMERCIAL

## 2024-02-29 VITALS
HEART RATE: 79 BPM | SYSTOLIC BLOOD PRESSURE: 136 MMHG | BODY MASS INDEX: 30.95 KG/M2 | WEIGHT: 216.2 LBS | HEIGHT: 70 IN | DIASTOLIC BLOOD PRESSURE: 78 MMHG | OXYGEN SATURATION: 98 %

## 2024-02-29 DIAGNOSIS — K21.00 GASTROESOPHAGEAL REFLUX DISEASE WITH ESOPHAGITIS WITHOUT HEMORRHAGE: ICD-10-CM

## 2024-02-29 DIAGNOSIS — K21.9 GASTROESOPHAGEAL REFLUX DISEASE WITHOUT ESOPHAGITIS: ICD-10-CM

## 2024-02-29 PROCEDURE — 99213 OFFICE O/P EST LOW 20 MIN: CPT | Performed by: INTERNAL MEDICINE

## 2024-02-29 RX ORDER — FAMOTIDINE 40 MG/1
40 TABLET, FILM COATED ORAL 2 TIMES DAILY
Qty: 180 TABLET | Refills: 3 | Status: SHIPPED | OUTPATIENT
Start: 2024-02-29

## 2024-02-29 RX ORDER — PANTOPRAZOLE SODIUM 40 MG/1
40 TABLET, DELAYED RELEASE ORAL DAILY
Qty: 90 TABLET | Refills: 2 | Status: SHIPPED | OUTPATIENT
Start: 2024-02-29

## 2024-02-29 NOTE — PROGRESS NOTES
[facility]  iM Murphy 58 y.o. female MRN: [id number]            Assessment & Plan:  Nancy 58-year-old female with GERD and atypical chest pain symptoms, likely visceral hypersensitivity relatively -24 pH study now much better.    1.  GERD with atypical chest pain:  -Significantly improved  -Continue with twice daily PPI therapy, twice daily famotidine  -She was able to stop Carafate  -Advised her that for 2 to 3 months if she is to continue to do well, she can reduce the famotidine to once daily and continue to taper thereafter  Follow-up in 6 months time  -If she has recurrent symptoms would consider amitriptyline once again      Mi was seen today for gerd.    Diagnoses and all orders for this visit:    Gastroesophageal reflux disease with esophagitis without hemorrhage  -     pantoprazole (PROTONIX) 40 mg tablet; Take 1 tablet (40 mg total) by mouth daily    Gastroesophageal reflux disease without esophagitis  -     famotidine (PEPCID) 40 MG tablet; Take 1 tablet (40 mg total) by mouth 2 (two) times a day            _____________________________________________________________        CC: Here for follow-up of GERD    HPI:  Mi Murphy is a 58 y.o.female who is here for follow-up of GERD.  This is a pleasant 58-year-old female has had some atypical chest pain, reflux with associated discomfort usually with eating certain foods though she had a -24 pH study.  She does note that her symptoms have been responsive to PPI, famotidine and Carafate.  She recently was able to stop the Carafate about 2 months, has had no recurrence of symptoms overall doing very well as long she modifies manage her diet, she eats carefully.  She denies any nausea, vomiting.  Her appetite has been good, weight has been stable, bowel movements are regular.    She is leaving for a cruise to the [facility] tomorrow.    She did not continue taking amitriptyline as her symptoms had improved.      ROS:  The  remainder of the ROS was negative except for the pertinent positives mentioned in HPI.      Allergies: Patient has no known allergies.    Medications:   Current Outpatient Medications:     Calcium Carbonate-Vit D-Min (CALCIUM 1200) 8292-6765 MG-UNIT CHEW, Chew daily after dinner, Disp: , Rfl:     famotidine (PEPCID) 40 MG tablet, Take 1 tablet (40 mg total) by mouth 2 (two) times a day, Disp: 180 tablet, Rfl: 3    oxybutynin (DITROPAN) 5 mg tablet, Take 5 mg by mouth 2 (two) times a day, Disp: , Rfl:     pantoprazole (PROTONIX) 40 mg tablet, Take 1 tablet (40 mg total) by mouth daily, Disp: 90 tablet, Rfl: 2    amitriptyline (ELAVIL) 10 mg tablet, TAKE 1 TABLET BY MOUTH DAILY AT BEDTIME (Patient not taking: Reported on 8/2/2023), Disp: 90 tablet, Rfl: 0    ibuprofen (MOTRIN) 800 mg tablet, Take 800 mg by mouth 3 (three) times a day (Patient not taking: Reported on 2/29/2024), Disp: , Rfl:     methocarbamol (ROBAXIN) 500 mg tablet, Take 1 tablet (500 mg total) by mouth 2 (two) times a day as needed for muscle spasms for up to 7 days, Disp: 14 tablet, Rfl: 0    naproxen (EC NAPROSYN) 500 MG EC tablet, Take 1 tablet (500 mg total) by mouth 2 (two) times a day with meals for 7 days, Disp: 14 tablet, Rfl: 0    sucralfate (CARAFATE) 1 g/10 mL suspension, Take 10 mL (1 g total) by mouth 4 (four) times a day (with meals and at bedtime) (Patient not taking: Reported on 2/29/2024), Disp: 840 mL, Rfl: 2    Trintellix 5 MG tablet, 5 mg daily at bedtime, Disp: , Rfl:     Past Medical History:   Diagnosis Date    Acid reflux     Anxiety     Arrhythmia     supraventricular    Bradyarrhythmia     Chest pain     when supine    Cholecystitis     De Quervain's disease (tenosynovitis)     Esophagitis     GERD (gastroesophageal reflux disease)     Osteoarthritis     PONV (postoperative nausea and vomiting)     Urinary frequency     Wears glasses        Past Surgical History:   Procedure Laterality Date    ATRIAL ABLATION SURGERY      av  "cath ablation an node    BACK SURGERY      lower back fusion w/cage    BUNIONECTOMY Bilateral     CERVICAL DISC SURGERY      4 screws-replaced disc-C-3    CHOLECYSTECTOMY      EGD      HAND SURGERY Right     wrist-release of tendon    LUMBAR FUSION      SC ENDOSCOPIC PLANTAR FASCIOTOMY Right 08/30/2016    Procedure: RELEASE FASCIA PLANTAR/FASCIOTOMY ENDOSCOPIC (EPF);  Surgeon: Kirsty Burgos DPM;  Location: [facility];  Service: Podiatry    SHOULDER ADHESION RELEASE Left     calcium deposit-US treatment    TENDON REPAIR      UPPER GASTROINTESTINAL ENDOSCOPY      WISDOM TOOTH EXTRACTION         Family History   Problem Relation Age of Onset    COPD Mother     Osteoporosis Mother     Other Mother         lung transplant-COPD, emphysema    Heart disease Father     Coronary artery disease Father     Other Father         MRSA infection    Scoliosis Sister     Cancer Maternal Uncle     Esophageal cancer Maternal Aunt         reports that she quit smoking about 16 years ago. Her smoking use included cigarettes. She started smoking about 31 years ago. She has a 15 pack-year smoking history. She has never used smokeless tobacco. She reports current alcohol use. She reports that she does not use drugs.      Physical Exam:    /78 (BP Location: Right arm, Patient Position: Sitting, Cuff Size: Standard)   Pulse 79   Ht 5' 10\" (1.778 m)   Wt 98.1 kg (216 lb 3.2 oz)   LMP 12/11/2017   SpO2 98%   BMI 31.02 kg/m²     Gen: wn/wd, NAD, healthy-appearing female  HEENT: anicteric, MMM, no cervical LAD  CVS: RRR, no m/r/g  CHEST: CTA b/l  ABD: +BS, soft, NT,ND, no hepatosplenomegaly  EXT: no c/c/e  NEURO: aaox3  SKIN: NO rashes    "

## 2024-07-15 ENCOUNTER — HOSPITAL ENCOUNTER (OUTPATIENT)
Dept: PULMONOLOGY | Facility: HOSPITAL | Age: 59
Discharge: HOME | End: 2024-07-15
Payer: COMMERCIAL

## 2024-07-15 DIAGNOSIS — R06.02 SHORTNESS OF BREATH: ICD-10-CM

## 2024-07-15 PROCEDURE — 94729 DIFFUSING CAPACITY: CPT | Performed by: STUDENT IN AN ORGANIZED HEALTH CARE EDUCATION/TRAINING PROGRAM

## 2024-07-15 PROCEDURE — 94729 DIFFUSING CAPACITY: CPT

## 2024-07-15 PROCEDURE — 94060 EVALUATION OF WHEEZING: CPT

## 2024-07-15 PROCEDURE — 94060 EVALUATION OF WHEEZING: CPT | Performed by: STUDENT IN AN ORGANIZED HEALTH CARE EDUCATION/TRAINING PROGRAM

## 2024-07-15 PROCEDURE — 94726 PLETHYSMOGRAPHY LUNG VOLUMES: CPT

## 2024-07-15 PROCEDURE — 94726 PLETHYSMOGRAPHY LUNG VOLUMES: CPT | Performed by: STUDENT IN AN ORGANIZED HEALTH CARE EDUCATION/TRAINING PROGRAM

## 2024-07-15 PROCEDURE — 94760 N-INVAS EAR/PLS OXIMETRY 1: CPT

## 2024-07-15 RX ORDER — ALBUTEROL SULFATE 2.5 MG/3ML
2.5 SOLUTION RESPIRATORY (INHALATION) ONCE
Status: COMPLETED | OUTPATIENT
Start: 2024-07-15 | End: 2024-07-15

## 2024-07-15 RX ADMIN — ALBUTEROL SULFATE 2.5 MG: 2.5 SOLUTION RESPIRATORY (INHALATION) at 14:15

## 2024-07-31 ENCOUNTER — APPOINTMENT (OUTPATIENT)
Dept: LAB | Facility: CLINIC | Age: 59
End: 2024-07-31
Payer: COMMERCIAL

## 2024-07-31 ENCOUNTER — TRANSCRIBE ORDERS (OUTPATIENT)
Dept: LAB | Facility: CLINIC | Age: 59
End: 2024-07-31

## 2024-07-31 DIAGNOSIS — Z00.00 ROUTINE GENERAL MEDICAL EXAMINATION AT A HEALTH CARE FACILITY: Primary | ICD-10-CM

## 2024-07-31 DIAGNOSIS — Z00.00 ROUTINE GENERAL MEDICAL EXAMINATION AT A HEALTH CARE FACILITY: ICD-10-CM

## 2024-07-31 LAB
25(OH)D3 SERPL-MCNC: 42.1 NG/ML (ref 30–100)
ALBUMIN SERPL BCG-MCNC: 4.4 G/DL (ref 3.5–5)
ALP SERPL-CCNC: 98 U/L (ref 34–104)
ALT SERPL W P-5'-P-CCNC: 20 U/L (ref 7–52)
ANION GAP SERPL CALCULATED.3IONS-SCNC: 8 MMOL/L (ref 4–13)
AST SERPL W P-5'-P-CCNC: 17 U/L (ref 13–39)
BACTERIA UR QL AUTO: ABNORMAL /HPF
BASOPHILS # BLD AUTO: 0.03 THOUSANDS/ÂΜL (ref 0–0.1)
BASOPHILS NFR BLD AUTO: 1 % (ref 0–1)
BILIRUB SERPL-MCNC: 0.5 MG/DL (ref 0.2–1)
BILIRUB UR QL STRIP: NEGATIVE
BUN SERPL-MCNC: 19 MG/DL (ref 5–25)
CALCIUM SERPL-MCNC: 9.8 MG/DL (ref 8.4–10.2)
CHLORIDE SERPL-SCNC: 107 MMOL/L (ref 96–108)
CHOLEST SERPL-MCNC: 180 MG/DL
CLARITY UR: CLEAR
CO2 SERPL-SCNC: 26 MMOL/L (ref 21–32)
COLOR UR: ABNORMAL
CREAT SERPL-MCNC: 0.82 MG/DL (ref 0.6–1.3)
EOSINOPHIL # BLD AUTO: 0.16 THOUSAND/ÂΜL (ref 0–0.61)
EOSINOPHIL NFR BLD AUTO: 3 % (ref 0–6)
ERYTHROCYTE [DISTWIDTH] IN BLOOD BY AUTOMATED COUNT: 13.2 % (ref 11.6–15.1)
GFR SERPL CREATININE-BSD FRML MDRD: 79 ML/MIN/1.73SQ M
GLUCOSE P FAST SERPL-MCNC: 96 MG/DL (ref 65–99)
GLUCOSE UR STRIP-MCNC: NEGATIVE MG/DL
HCT VFR BLD AUTO: 44.5 % (ref 34.8–46.1)
HDLC SERPL-MCNC: 54 MG/DL
HGB BLD-MCNC: 14.6 G/DL (ref 11.5–15.4)
HGB UR QL STRIP.AUTO: NEGATIVE
IMM GRANULOCYTES # BLD AUTO: 0.02 THOUSAND/UL (ref 0–0.2)
IMM GRANULOCYTES NFR BLD AUTO: 0 % (ref 0–2)
KETONES UR STRIP-MCNC: NEGATIVE MG/DL
LDLC SERPL CALC-MCNC: 101 MG/DL (ref 0–100)
LEUKOCYTE ESTERASE UR QL STRIP: ABNORMAL
LYMPHOCYTES # BLD AUTO: 2.13 THOUSANDS/ÂΜL (ref 0.6–4.47)
LYMPHOCYTES NFR BLD AUTO: 41 % (ref 14–44)
MCH RBC QN AUTO: 29.1 PG (ref 26.8–34.3)
MCHC RBC AUTO-ENTMCNC: 32.8 G/DL (ref 31.4–37.4)
MCV RBC AUTO: 89 FL (ref 82–98)
MONOCYTES # BLD AUTO: 0.5 THOUSAND/ÂΜL (ref 0.17–1.22)
MONOCYTES NFR BLD AUTO: 10 % (ref 4–12)
NEUTROPHILS # BLD AUTO: 2.37 THOUSANDS/ÂΜL (ref 1.85–7.62)
NEUTS SEG NFR BLD AUTO: 45 % (ref 43–75)
NITRITE UR QL STRIP: NEGATIVE
NON-SQ EPI CELLS URNS QL MICRO: ABNORMAL /HPF
NONHDLC SERPL-MCNC: 126 MG/DL
NRBC BLD AUTO-RTO: 0 /100 WBCS
PH UR STRIP.AUTO: 6 [PH]
PLATELET # BLD AUTO: 233 THOUSANDS/UL (ref 149–390)
PMV BLD AUTO: 10.2 FL (ref 8.9–12.7)
POTASSIUM SERPL-SCNC: 4.7 MMOL/L (ref 3.5–5.3)
PROT SERPL-MCNC: 7 G/DL (ref 6.4–8.4)
PROT UR STRIP-MCNC: NEGATIVE MG/DL
RBC # BLD AUTO: 5.02 MILLION/UL (ref 3.81–5.12)
RBC #/AREA URNS AUTO: ABNORMAL /HPF
SODIUM SERPL-SCNC: 141 MMOL/L (ref 135–147)
SP GR UR STRIP.AUTO: 1.01 (ref 1–1.03)
T4 FREE SERPL-MCNC: 0.79 NG/DL (ref 0.61–1.12)
TRIGL SERPL-MCNC: 123 MG/DL
TSH SERPL DL<=0.05 MIU/L-ACNC: 2.01 UIU/ML (ref 0.45–4.5)
UROBILINOGEN UR STRIP-ACNC: <2 MG/DL
VIT B12 SERPL-MCNC: 426 PG/ML (ref 180–914)
WBC # BLD AUTO: 5.21 THOUSAND/UL (ref 4.31–10.16)
WBC #/AREA URNS AUTO: ABNORMAL /HPF

## 2024-07-31 PROCEDURE — 85025 COMPLETE CBC W/AUTO DIFF WBC: CPT

## 2024-07-31 PROCEDURE — 84439 ASSAY OF FREE THYROXINE: CPT

## 2024-07-31 PROCEDURE — 80061 LIPID PANEL: CPT

## 2024-07-31 PROCEDURE — 82607 VITAMIN B-12: CPT

## 2024-07-31 PROCEDURE — 84443 ASSAY THYROID STIM HORMONE: CPT

## 2024-07-31 PROCEDURE — 82306 VITAMIN D 25 HYDROXY: CPT

## 2024-07-31 PROCEDURE — 81001 URINALYSIS AUTO W/SCOPE: CPT

## 2024-07-31 PROCEDURE — 80053 COMPREHEN METABOLIC PANEL: CPT

## 2024-07-31 PROCEDURE — 36415 COLL VENOUS BLD VENIPUNCTURE: CPT

## 2024-08-26 ENCOUNTER — OFFICE VISIT (OUTPATIENT)
Dept: GASTROENTEROLOGY | Facility: AMBULARY SURGERY CENTER | Age: 59
End: 2024-08-26
Payer: COMMERCIAL

## 2024-08-26 VITALS
OXYGEN SATURATION: 98 % | SYSTOLIC BLOOD PRESSURE: 122 MMHG | DIASTOLIC BLOOD PRESSURE: 84 MMHG | HEIGHT: 70 IN | WEIGHT: 224.4 LBS | HEART RATE: 63 BPM | BODY MASS INDEX: 32.13 KG/M2

## 2024-08-26 DIAGNOSIS — K21.9 GASTROESOPHAGEAL REFLUX DISEASE WITHOUT ESOPHAGITIS: ICD-10-CM

## 2024-08-26 DIAGNOSIS — K21.00 GASTROESOPHAGEAL REFLUX DISEASE WITH ESOPHAGITIS WITHOUT HEMORRHAGE: ICD-10-CM

## 2024-08-26 PROCEDURE — 99213 OFFICE O/P EST LOW 20 MIN: CPT | Performed by: PHYSICIAN ASSISTANT

## 2024-08-26 RX ORDER — FAMOTIDINE 40 MG/1
40 TABLET, FILM COATED ORAL 2 TIMES DAILY
Qty: 180 TABLET | Refills: 3 | Status: SHIPPED | OUTPATIENT
Start: 2024-08-26

## 2024-08-26 RX ORDER — SUCRALFATE ORAL 1 G/10ML
1 SUSPENSION ORAL
Qty: 840 ML | Refills: 5 | Status: SHIPPED | OUTPATIENT
Start: 2024-08-26

## 2024-08-26 RX ORDER — PANTOPRAZOLE SODIUM 40 MG/1
40 TABLET, DELAYED RELEASE ORAL DAILY
Qty: 90 TABLET | Refills: 2 | Status: SHIPPED | OUTPATIENT
Start: 2024-08-26

## 2024-08-26 NOTE — ASSESSMENT & PLAN NOTE
Longstanding PPI dependent GERD, no evidence of Carey's on last EGD in 2022, no alarm symptoms at this time    -Advised patient regarding risks and benefits of long-term PPI therapy, believe that risks are outweighed by benefits at this point, advised patient that if she continues to do very well she can consider trialing every other day dosage of PPI for 3 to 4 weeks and tapering further if she continues to do well, otherwise she may continue PPI and H2 blocker therapy as she is currently doing    -May also use Carafate as needed as patient reports this has been helpful    -Follow-up in 1 year or sooner as needed, otherwise she will be due for colonoscopy next year for colon cancer screening

## 2024-08-26 NOTE — PROGRESS NOTES
Follow-up Note -  [facility]  Mi Murphy 1965 59 y.o. female         ASSESSMENT & PLAN:    GERD (gastroesophageal reflux disease)  Longstanding PPI dependent GERD, no evidence of Carey's on last EGD in 2022, no alarm symptoms at this time    -Advised patient regarding risks and benefits of long-term PPI therapy, believe that risks are outweighed by benefits at this point, advised patient that if she continues to do very well she can consider trialing every other day dosage of PPI for 3 to 4 weeks and tapering further if she continues to do well, otherwise she may continue PPI and H2 blocker therapy as she is currently doing    -May also use Carafate as needed as patient reports this has been helpful    -Follow-up in 1 year or sooner as needed, otherwise she will be due for colonoscopy next year for colon cancer screening      Reason: Follow-up; GERD    HPI: 59-year-old female with history of cholecystectomy who presents for follow-up, patient has been taking pantoprazole for acid reflux, her last EGD in October 2022 showed no evidence of Carey's, last colonoscopy in 2015 was normal for which 10-year recall was recommended.  At this time the patient says that she did have an episode of bad burning epigastric pain about a week or 2 ago, she took some extra Carafate and this was helpful, she says this is an unusual event and she attributes this to increased stress lately, she said her mom did recently passed away and there was work to be done settling the estate.  In any event she says she is feeling well now with believes that the pantoprazole once daily has also been overall helpful, she also takes Pepcid 40 mg twice daily and Carafate on an as-needed basis.  She uses ibuprofen about once a month, denies any regular use of NSAIDs otherwise.    REVIEW OF SYSTEMS:      CONSTITUTIONAL: Denies any fever, chills, or rigors. Good appetite, and no recent weight loss.  HEENT: No earache or  tinnitus. Denies hearing loss or visual disturbances.  CARDIOVASCULAR: No chest pain or palpitations.   RESPIRATORY: Denies any cough, hemoptysis, shortness of breath or dyspnea on exertion.  GASTROINTESTINAL: As noted in the History of Present Illness.   GENITOURINARY: No problems with urination. Denies any hematuria or dysuria.  NEUROLOGIC: No dizziness or vertigo, denies headaches.   MUSCULOSKELETAL: Denies any muscle or joint pain.   SKIN: Denies skin rashes or itching.   ENDOCRINE: Denies excessive thirst. Denies intolerance to heat or cold.  PSYCHOSOCIAL: Denies depression or anxiety. Denies any recent memory loss.     Past Medical History:   Diagnosis Date    Acid reflux     Anxiety     Arrhythmia     supraventricular    Bradyarrhythmia     Chest pain     when supine    Cholecystitis     De Quervain's disease (tenosynovitis)     Esophagitis     GERD (gastroesophageal reflux disease)     Osteoarthritis     PONV (postoperative nausea and vomiting)     Urinary frequency     Wears glasses       Past Surgical History:   Procedure Laterality Date    ATRIAL ABLATION SURGERY      av cath ablation an node    BACK SURGERY      lower back fusion w/cage    BUNIONECTOMY Bilateral     CERVICAL DISC SURGERY      4 screws-replaced disc-C-3    CHOLECYSTECTOMY      EGD      HAND SURGERY Right     wrist-release of tendon    LUMBAR FUSION      CO ENDOSCOPIC PLANTAR FASCIOTOMY Right 08/30/2016    Procedure: RELEASE FASCIA PLANTAR/FASCIOTOMY ENDOSCOPIC (EPF);  Surgeon: Kirsty Burgos DPM;  Location: [facility];  Service: Podiatry    SHOULDER ADHESION RELEASE Left     calcium deposit-US treatment    TENDON REPAIR      UPPER GASTROINTESTINAL ENDOSCOPY      WISDOM TOOTH EXTRACTION       Social History     Socioeconomic History    Marital status:      Spouse name: Not on file    Number of children: Not on file    Years of education: Not on file    Highest education level: Not on file   Occupational History    Not on file    Tobacco Use    Smoking status: Former     Current packs/day: 0.00     Average packs/day: 1 pack/day for 15.0 years (15.0 ttl pk-yrs)     Types: Cigarettes     Start date: 1992     Quit date: 2007     Years since quittin.3    Smokeless tobacco: Never    Tobacco comments:     quit 13 years ago   Vaping Use    Vaping status: Never Used   Substance and Sexual Activity    Alcohol use: Yes     Alcohol/week: 0.0 standard drinks of alcohol     Comment: 1x monthly    Drug use: No    Sexual activity: Yes     Partners: Male   Other Topics Concern    Not on file   Social History Narrative    Not on file     Social Determinants of Health     Financial Resource Strain: Not on file   Food Insecurity: Not on file   Transportation Needs: Not on file   Physical Activity: Not on file   Stress: Not on file   Social Connections: Not on file   Intimate Partner Violence: Not on file   Housing Stability: Not on file     Family History   Problem Relation Age of Onset    COPD Mother     Osteoporosis Mother     Other Mother         lung transplant-COPD, emphysema    Heart disease Father     Coronary artery disease Father     Other Father         MRSA infection    Scoliosis Sister     Cancer Maternal Uncle     Esophageal cancer Maternal Aunt      Patient has no known allergies.  Current Outpatient Medications   Medication Sig Dispense Refill    Calcium Carbonate-Vit D-Min (CALCIUM 1200) 9064-2125 MG-UNIT CHEW Chew daily after dinner      famotidine (PEPCID) 40 MG tablet Take 1 tablet (40 mg total) by mouth 2 (two) times a day 180 tablet 3    oxybutynin (DITROPAN) 5 mg tablet Take 5 mg by mouth 2 (two) times a day      pantoprazole (PROTONIX) 40 mg tablet Take 1 tablet (40 mg total) by mouth daily 90 tablet 2    sucralfate (CARAFATE) 1 g/10 mL suspension Take 10 mL (1 g total) by mouth 3 (three) times a day before meals 840 mL 5    Trintellix 5 MG tablet 5 mg daily at bedtime      amitriptyline (ELAVIL) 10 mg tablet TAKE 1 TABLET  "BY MOUTH DAILY AT BEDTIME (Patient not taking: Reported on 8/2/2023) 90 tablet 0    ibuprofen (MOTRIN) 800 mg tablet Take 800 mg by mouth 3 (three) times a day (Patient not taking: Reported on 2/29/2024)      methocarbamol (ROBAXIN) 500 mg tablet Take 1 tablet (500 mg total) by mouth 2 (two) times a day as needed for muscle spasms for up to 7 days 14 tablet 0    naproxen (EC NAPROSYN) 500 MG EC tablet Take 1 tablet (500 mg total) by mouth 2 (two) times a day with meals for 7 days 14 tablet 0     No current facility-administered medications for this visit.       Blood pressure 122/84, pulse 63, height 5' 10\" (1.778 m), weight 102 kg (224 lb 6.4 oz), last menstrual period 12/11/2017, SpO2 98%, not currently breastfeeding.    PHYSICAL EXAM:      General Appearance:   Alert, cooperative, no distress, appears stated age    HEENT:   Normocephalic, atraumatic, anicteric.     Neck:  Supple, symmetrical, trachea midline, no adenopathy;    thyroid: no enlargement/tenderness/nodules; no carotid  bruit or JVD    Lungs:   Clear to auscultation bilaterally; no rales, rhonchi or wheezing; respirations unlabored    Heart::   S1 and S2 normal; regular rate and rhythm; no murmur, rub, or gallop.   Abdomen:   Soft, non-tender, non-distended; normal bowel sounds; no masses, no organomegaly    Extremities: No edema, erythema, wounds, rashes   Rectal:   Deferred                      Lab Results   Component Value Date    WBC 5.21 07/31/2024    HGB 14.6 07/31/2024    HCT 44.5 07/31/2024    MCV 89 07/31/2024     07/31/2024     Lab Results   Component Value Date    GLUCOSE 91 01/20/2014    CALCIUM 9.8 07/31/2024     01/20/2014    K 4.7 07/31/2024    CO2 26 07/31/2024     07/31/2024    BUN 19 07/31/2024    CREATININE 0.82 07/31/2024     Lab Results   Component Value Date    ALT 20 07/31/2024    AST 17 07/31/2024    ALKPHOS 98 07/31/2024    BILITOT 0.49 01/20/2014     No results found for: \"INR\", \"PROTIME\"    No results " found.

## 2025-04-05 DIAGNOSIS — K21.00 GASTROESOPHAGEAL REFLUX DISEASE WITH ESOPHAGITIS WITHOUT HEMORRHAGE: ICD-10-CM

## 2025-04-07 RX ORDER — PANTOPRAZOLE SODIUM 40 MG/1
40 TABLET, DELAYED RELEASE ORAL DAILY
Qty: 90 TABLET | Refills: 1 | Status: SHIPPED | OUTPATIENT
Start: 2025-04-07

## 2025-06-12 ENCOUNTER — APPOINTMENT (OUTPATIENT)
Dept: RADIOLOGY | Facility: CLINIC | Age: 60
End: 2025-06-12
Payer: COMMERCIAL

## 2025-06-12 DIAGNOSIS — M25.559 HIP PAIN, UNSPECIFIED LATERALITY: ICD-10-CM

## 2025-06-12 PROCEDURE — 73523 X-RAY EXAM HIPS BI 5/> VIEWS: CPT

## 2025-06-18 ENCOUNTER — EVALUATION (OUTPATIENT)
Dept: PHYSICAL THERAPY | Facility: REHABILITATION | Age: 60
End: 2025-06-18
Payer: COMMERCIAL

## 2025-06-18 DIAGNOSIS — M25.552 LEFT HIP PAIN: Primary | ICD-10-CM

## 2025-06-18 PROCEDURE — 97162 PT EVAL MOD COMPLEX 30 MIN: CPT

## 2025-06-18 PROCEDURE — 97112 NEUROMUSCULAR REEDUCATION: CPT

## 2025-06-18 NOTE — PROGRESS NOTES
PT Evaluation     Today's date: 2025  Patient name: Mi Murphy  : 1965  MRN: [id number]  Referring provider: Shoenberger, Douglas C,*  Dx:   Encounter Diagnosis     ICD-10-CM    1. Left hip pain  M25.552           Start Time: 0800  Stop Time: 0848  Total time in clinic (min): 48 minutes    Assessment  Impairments: abnormal gait, abnormal muscle tone, abnormal or restricted ROM, activity intolerance, impaired physical strength, lacks appropriate home exercise program and pain with function    Assessment details: Mi Murphy is a 60 y.o. female who presents to physical therapy with reports of L hip pain. Examination findings include limited and painful L hip PROM, decreased hip strength bilaterally, TTP of L anterior hip and groin, TTP of L lateral hip near greater trochanter and increased tension of L quadriceps, hip flexor and TFL. Examination findings also include gait dysfunction, a positive Eliane test for L hip pain a a positive L Hip Scour test. These impairments are limiting the pt's ability to perform ADLs and household chores, to ambulate, and participate in recreational activities. Pt will benefit from skilled outpatient PT to address the below stated impairments, to address therapy goals, to reduce pain, and improve function.     Therapist explained to pt: findings of IE, rehab diagnosis, and POC. Pt-centered goals reviewed and confirmed by pt. Instruction also given for HEP, which includes hip isometrics and a TFL stretch. Instructed pt to stop performing her exercises if it seems like they are worsening her symptoms. Pt expressed verbal agreement and understanding and verified understanding via teach back method. Pt also expressed satisfaction that their current concerns were addressed at session end.    Understanding of Dx/Px/POC: good     Prognosis: fair    Goals  Short term goals: to be met in 3-4 weeks  Pt independent with initial HEP, rationale, technique and frequency, for  ROM and pain control.  Pt will report at least a 25% reduction in subjective pain complaints/symptoms to better manage ADLs and household chores, and so she can walk around [place] and [place] more comfortably.  Improve max pain level by 2 points on the numeric pain rating scale indicating a clinically significant reduction in pain level.    Long term goals: to be met by discharge  Pt will have full and pain free L hip ROM to better manage ADLs and household chores.  Pt will report a 75% or > reduction in subjective pain complaints/symptoms to better manage ADLs and household chores.  Improve B hip abduction MMT to a 4/5 or greater for improved lumbopelvic stability.  Pt will improve FOTO score to equal to or better than expected outcome indicating an overall improvement in pain and function   Pt will be able to perform ADLs, iADLS, and work duties without pain or restriction indicating return to PLOF.  Pt independent with rationale, technique and plan for performance of advanced HEP to ensure independent self-management of symptoms upon discharge.  Achieve pts therapy goal: Pt would like her pain to go away.    Plan  Patient would benefit from: skilled physical therapy  Planned modality interventions: cryotherapy, low level laser therapy and TENS  Other planned modality interventions: thermotherapy: hydrotherm packs    Planned therapy interventions: flexibility, functional ROM exercises, home exercise program, IASTM, manual therapy, massage, neuromuscular re-education, patient/caregiver education, strengthening, stretching, therapeutic activities and therapeutic exercise    Frequency: 2x week  Duration in weeks: 10  Plan of Care beginning date: 6/18/2025  Plan of Care expiration date: 8/27/2025  Treatment plan discussed with: patient        Subjective Evaluation    History of Present Illness  Mechanism of injury: Pt presents to physical therapy with reports of L hip pain that she has had for about 2 weeks now with  "insidious onset. She was just walking and the pain she got stopped her in her tracks.    The pain has been off and on, and was not every single time she took a step. She has started to get the pain more frequently.    She got a steroid injection yesterday at the doctor but it hasn't started working yet.    She is going on vacation in 2 weeks to [place] and [place], and would like to be able to walk easier.    She has also had back pain for years. She has never had her current symptoms from the back. She has a lumbar spinal fusion with a cage.    Impression of X-Ray of bilateral hips and pelvis from 25: \"No acute osseous abnormality of either hip\"  Patient Goals  Patient goals for therapy: decreased pain, increased motion and increased strength  Patient goal: Pt would like her pain to go away.  Pain  Current pain ratin  At best pain ratin  At worst pain ratin  Location: L anterior and lateral hip and anterior thigh  Quality: sharp (Taking her weight off of her leg dulls the pain, and it goes back to being minimally painful within a short period of time.)  Relieving factors: rest (Ice. 800 mg of Ibuprofen.)  Aggravating factors: walking, standing and stair climbing (Is only able to be on her feet for 30-45 minutes before it begins to really bother her.)    Social Support  Steps to enter house: yes (3 steps to get into the house.)  Stairs in house: yes (Full flight)     Employment status: working (She is a  so she sits all day. 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 Increased tension of L quadriceps, hip flexor and TFL muscles  Myotomes (L/R): WNL BLE  Dermatome: (pinprick- L/R):   intact BLE     Reflexes:  (L/R) L4:   2+     S1:    2+     Gait: antalgic      Lumbar  % of normal   Flex. 100%   Extn. 100%   SB Left 75%, Tightness in L hip   SB " "Right 100%           MMT          PROM    Hip       L       R      L     R   Flex. 3+, pain 4 100 degrees, pain    Extn. 3- 3-     Abd. 2+, pain 3-     IR.   25 degrees, pain    ER.   45 degrees           Knee       Extension 3+, pain 5     Flexion 4 4        Neuro Dynamic Testing:  Straight leg raise:   L=  negative    R=   negative       Crossed Straight leg raise:   L= negative   R=   negative              SI joint:          Provocation testing: Compression=   negative  Distraction=  negative     Hip:   scour=   positive on L for hip pain      lit = positive on L for hip pain        FOTO Score:    Left hip  Date: 6/18/25    Score: 49/67         Precautions: Anxiety, H/o bradyarrhythmia, OA, H/o SVT        Manuals 6/18                                                                Neuro Re-Ed             Hip isometrics Standing abd 7x5\" Supine hip flexion 10x5\"                                                                                          Ther Ex             Standing TFL stretch 3x30\" LLE                                                                                                       Ther Activity             Pt education Eval findings, POC, HEP - JY                         Gait Training                                       Modalities                                            "

## 2025-06-18 NOTE — LETTER
2025    Douglas C Shoenberger, MD  [address] [place] [id number]    Patient: Mi Murphy   YOB: 1965   Date of Visit: 2025     Encounter Diagnosis     ICD-10-CM    1. Left hip pain  M25.552           Dear Dr. Douglas C Shoenberger, MD:    Thank you for your recent referral of Mi Murphy. Please review the attached evaluation summary from Mi's recent visit.     Please verify that you agree with the plan of care by signing the attached order.     If you have any questions or concerns, please do not hesitate to call.     I sincerely appreciate the opportunity to share in the care of one of your patients and hope to have another opportunity to work with you in the near future.       Sincerely,    Sissy Almaguer, PT      Referring Provider:      I certify that I have read the below Plan of Care and certify the need for these services furnished under this plan of treatment while under my care.                    Douglas C Shoenberger, MD  [address] [place] [id number]  Via Fax: [phone]          PT Evaluation     Today's date: 2025  Patient name: Mi Murphy  : 1965  MRN: [id number]  Referring provider: Shoenberger, Douglas C,*  Dx:   Encounter Diagnosis     ICD-10-CM    1. Left hip pain  M25.552           Start Time: 0800  Stop Time: 0848  Total time in clinic (min): 48 minutes    Assessment  Impairments: abnormal gait, abnormal muscle tone, abnormal or restricted ROM, activity intolerance, impaired physical strength, lacks appropriate home exercise program and pain with function    Assessment details: Mi Mruphy is a 60 y.o. female who presents to physical therapy with reports of L hip pain. Examination findings include limited and painful L hip PROM, decreased hip strength bilaterally, TTP of L anterior hip and groin, TTP of L lateral hip near greater trochanter and increased tension of L quadriceps, hip flexor and  TFL. Examination findings also include gait dysfunction, a positive Eliane test for L hip pain a a positive L Hip Scour test. These impairments are limiting the pt's ability to perform ADLs and household chores, to ambulate, and participate in recreational activities. Pt will benefit from skilled outpatient PT to address the below stated impairments, to address therapy goals, to reduce pain, and improve function.     Therapist explained to pt: findings of IE, rehab diagnosis, and POC. Pt-centered goals reviewed and confirmed by pt. Instruction also given for HEP, which includes hip isometrics and a TFL stretch. Instructed pt to stop performing her exercises if it seems like they are worsening her symptoms. Pt expressed verbal agreement and understanding and verified understanding via teach back method. Pt also expressed satisfaction that their current concerns were addressed at session end.    Understanding of Dx/Px/POC: good     Prognosis: fair    Goals  Short term goals: to be met in 3-4 weeks  Pt independent with initial HEP, rationale, technique and frequency, for ROM and pain control.  Pt will report at least a 25% reduction in subjective pain complaints/symptoms to better manage ADLs and household chores, and so she can walk around [place] and [place] more comfortably.  Improve max pain level by 2 points on the numeric pain rating scale indicating a clinically significant reduction in pain level.    Long term goals: to be met by discharge  Pt will have full and pain free L hip ROM to better manage ADLs and household chores.  Pt will report a 75% or > reduction in subjective pain complaints/symptoms to better manage ADLs and household chores.  Improve B hip abduction MMT to a 4/5 or greater for improved lumbopelvic stability.  Pt will improve FOTO score to equal to or better than expected outcome indicating an overall improvement in pain and function   Pt will be able to perform ADLs, iADLS, and work duties  "without pain or restriction indicating return to PLOF.  Pt independent with rationale, technique and plan for performance of advanced HEP to ensure independent self-management of symptoms upon discharge.  Achieve pts therapy goal: Pt would like her pain to go away.    Plan  Patient would benefit from: skilled physical therapy  Planned modality interventions: cryotherapy, low level laser therapy and TENS  Other planned modality interventions: thermotherapy: hydrotherm packs    Planned therapy interventions: flexibility, functional ROM exercises, home exercise program, IASTM, manual therapy, massage, neuromuscular re-education, patient/caregiver education, strengthening, stretching, therapeutic activities and therapeutic exercise    Frequency: 2x week  Duration in weeks: 10  Plan of Care beginning date: 2025  Plan of Care expiration date: 2025  Treatment plan discussed with: patient        Subjective Evaluation    History of Present Illness  Mechanism of injury: Pt presents to physical therapy with reports of L hip pain that she has had for about 2 weeks now with insidious onset. She was just walking and the pain she got stopped her in her tracks.    The pain has been off and on, and was not every single time she took a step. She has started to get the pain more frequently.    She got a steroid injection yesterday at the doctor but it hasn't started working yet.    She is going on vacation in 2 weeks to [place], and would like to be able to walk easier.    She has also had back pain for years. She has never had her current symptoms from the back. She has a lumbar spinal fusion with a cage.    Impression of X-Ray of bilateral hips and pelvis from 25: \"No acute osseous abnormality of either hip\"  Patient Goals  Patient goals for therapy: decreased pain, increased motion and increased strength  Patient goal: Pt would like her pain to go away.  Pain  Current pain ratin  At best pain rating: " "2  At worst pain ratin  Location: L anterior and lateral hip and anterior thigh  Quality: sharp (Taking her weight off of her leg dulls the pain, and it goes back to being minimally painful within a short period of time.)  Relieving factors: rest (Ice. 800 mg of Ibuprofen.)  Aggravating factors: walking, standing and stair climbing (Is only able to be on her feet for 30-45 minutes before it begins to really bother her.)    Social Support  Steps to enter house: yes (3 steps to get into the house.)  Stairs in house: yes (Full flight)     Employment status: working (She is a  so she sits all day. 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 Increased tension of L quadriceps, hip flexor and TFL muscles  Myotomes (L/R): WNL BLE  Dermatome: (pinprick- L/R):   intact BLE     Reflexes:  (L/R) L4:   2+     S1:    2+     Gait: antalgic      Lumbar  % of normal   Flex. 100%   Extn. 100%   SB Left 75%, Tightness in L hip   SB Right 100%           MMT          PROM    Hip       L       R      L     R   Flex. 3+, pain 4 100 degrees, pain    Extn. 3- 3-     Abd. 2+, pain 3-     IR.   25 degrees, pain    ER.   45 degrees           Knee       Extension 3+, pain 5     Flexion 4 4        Neuro Dynamic Testing:  Straight leg raise:   L=  negative    R=   negative       Crossed Straight leg raise:   L= negative   R=   negative              SI joint:          Provocation testing: Compression=   negative  Distraction=  negative     Hip:   scour=   positive on L for hip pain      lit = positive on L for hip pain        FOTO Score:    Left hip  Date: 25    Score: 49/67         Precautions: Anxiety, H/o bradyarrhythmia, OA, H/o SVT        Manuals                                                                 Neuro Re-Ed             Hip isometrics Standing abd 7x5\" Supine hip " "flexion 10x5\"                                                                                          Ther Ex             Standing TFL stretch 3x30\" LLE                                                                                                       Ther Activity             Pt education Eval findings, POC, HEP - JY                         Gait Training                                       Modalities                                                            "

## 2025-06-26 ENCOUNTER — OFFICE VISIT (OUTPATIENT)
Dept: PHYSICAL THERAPY | Facility: REHABILITATION | Age: 60
End: 2025-06-26
Payer: COMMERCIAL

## 2025-06-26 DIAGNOSIS — M25.552 LEFT HIP PAIN: Primary | ICD-10-CM

## 2025-06-26 PROCEDURE — 97140 MANUAL THERAPY 1/> REGIONS: CPT

## 2025-06-26 PROCEDURE — 97110 THERAPEUTIC EXERCISES: CPT

## 2025-06-26 PROCEDURE — 97112 NEUROMUSCULAR REEDUCATION: CPT

## 2025-06-26 NOTE — PROGRESS NOTES
Daily Note     Today's date: 2025  Patient name: Mi Murphy  : 1965  MRN: [id number]  Referring provider: Shoenberger, Douglas C,*  Dx:   Encounter Diagnosis     ICD-10-CM    1. Left hip pain  M25.552           Start Time: 840  Stop Time: 920  Total time in clinic (min): 40 minutes    Subjective: Pt reports that her hip was a little sore after her IE, but she rested and iced it later that day and it was feeling less sore the next day. She was able to do her exercises for about two days, and then she had to spend a lot of time at work and packing because she is moving, which made her pain increase so she did not do her exercises after that. She saw her doctor 25 who told her to take Ibuprofen 3x/day and to try to ice her hip more often to improve her pain.    She is going to [place] for vacation in a week and a half, and is not sure how she is going to walk around the [place] all day. Her hip does not bother her as much when she is pushing a shopping cart or mowing the lawn.      Objective: See treatment diary below    Palpation: TTP of L anterior hip and groin and lateral near greater trochanter. Increased tension of L quadriceps, hip flexor and TFL muscles    Access Code: [email or web address]  URL: [email or web address]  Date: 2025  Prepared by: Sissy Almaguer    Exercises  - Supine Bridge  - 1 x daily - 7 x weekly - 2 sets - 7 reps - 3-5 seconds hold  - Hooklying Isometric Hip Flexion  - 1-2 x daily - 7 x weekly - 1 sets - 10 reps - 5 seconds hold  - ITB Stretch at Wall  - 2 x daily - 7 x weekly - 3 reps - 30 seconds hold  - Supine Hip Adduction Isometric with Ball  - 1-2 x daily - 7 x weekly - 1 sets - 10 reps - 5 seconds hold      Assessment: Tolerated treatment fair. Gentle STM of her L quadriceps, hip flexor and TFL muscles was performed today to attempt to decrease soft tissue restrictions in these areas. Pt is very TTP in these areas on her L leg. Had pt ride the recumbent  "bike to improve her lower extremity ROM, but she was only able to ride it for a few minutes due to an increase in hip pain. Added bridges and hip adduction isometrics to her HEP to maintain and improve her hip strength and for L hip pain control.     Pt reported an increase in L hip pain with the resisted clamshell exercise, and performing a significant amount of hip flexion isometric exercises. Instructed the pt to perform fewer repetitions of the hip flexor isometric exercise as part of her HEP because 10 repetitions did not increase her pain much. Instructed her that she could perform her HEP hip isometric exercises more than once each day if they did not seem to significantly worsen her pain, however, she should stop performing them if her pain increases a lot. Performed gait training with an SPC to attempt to off-load her L hip to decrease her pain when she walks, and she reported some improvement in her hip pain with a step-to pattern with the SPC. Recommended that she try utilizing a cane or walking stick at home to offload her hip, and so she could walk more comfortably and effectively on vacation. Continued to reiterate the importance of her resting as much as she is able to when she can. She demonstrated a good verbal understanding of all of the information today via teach back method. Patient would benefit from continued PT to address impairments, to address therapy goals, to reduce pain, and improve function.      Plan: Continue per plan of care.  Progress treatment as tolerated.       Precautions: Anxiety, H/o bradyarrhythmia, OA, H/o SVT        Manuals 6/18 6/26           Gentle hip STM  8' - JY                                                  Neuro Re-Ed             Hip isometrics Standing abd 7x5\" Supine hip flexion 10x5\" Supine hip adduction 10x5\" Supine hip flexion 2x10x5\"           Clamshells  Hooklying 5x5\" OTB loop                                                                            Ther " "Ex             Standing TFL stretch 3x30\" LLE Supine manual 3' - JY           Recumbent bike  4' L1           Heel slides             Bridges  2x7x3\"                                                               Ther Activity             Pt education Eval findings, POC, HEP - JY                         Gait Training             SPC  20 ft x2                         Modalities                                            "

## 2025-06-30 ENCOUNTER — OFFICE VISIT (OUTPATIENT)
Dept: PHYSICAL THERAPY | Facility: REHABILITATION | Age: 60
End: 2025-06-30
Payer: COMMERCIAL

## 2025-06-30 DIAGNOSIS — M25.552 LEFT HIP PAIN: Primary | ICD-10-CM

## 2025-06-30 PROCEDURE — 97110 THERAPEUTIC EXERCISES: CPT

## 2025-06-30 PROCEDURE — 97140 MANUAL THERAPY 1/> REGIONS: CPT

## 2025-06-30 PROCEDURE — 97112 NEUROMUSCULAR REEDUCATION: CPT

## 2025-06-30 NOTE — PROGRESS NOTES
"Daily Note     Today's date: 2025  Patient name: Mi Murphy  : 1965  MRN: [id number]  Referring provider: Shoenberger, Douglas C,*  Dx:   Encounter Diagnosis     ICD-10-CM    1. Left hip pain  M25.552           Start Time: 1215  Stop Time: 1256  Total time in clinic (min): 41 minutes    Subjective: Pt reports overall she has not seen much change in symptoms of L hip.  Was feeling pain and soreness following this LV, but no more or worse than usual.  Is a little sore this AM, noting she had tried some of her HEP this morning which may have caused this soreness.        Objective: See treatment diary below      Assessment: Tolerated treatment well.  Continues to present with moderate to significant soft tissue restriction with limited tolerance to pressure along L TFL/distal hip flexor.  Programming modified slightly in effort to load musculature while minimize sharp pain and increase of symptoms.  Respond well to programming today with slight decrease in symptoms by end of treatment.  Patient would benefit from continued PT      Plan: Continue per plan of care.      Precautions: Anxiety, H/o bradyarrhythmia, OA, H/o SVT        Manuals           Gentle hip STM  8' - JY 10' [employer]                                                 Neuro Re-Ed             Hip isometrics Standing abd 7x5\" Supine hip flexion 10x5\" Supine hip adduction 10x5\" Supine hip flexion 2x10x5\" Supine hip adduction 10x5\" Supine hip flexion 1x10x5\"          Clamshells  Hooklying 5x5\" OTB loop Hooklying  [place] loop  3\"  3x5                                                                           Ther Ex             Standing TFL stretch 3x30\" LLE Supine manual 3' - JY Supine hip flexor stretch  20\"x3          Recumbent bike  4' L1 5' L1          Heel slides             Bridges  2x7x3\" 3x6x3\"                                                              Ther Activity             Pt education Eval findings, POC, HEP - JY          "                Gait Training             SPC  20 ft x2                         Modalities

## 2025-07-03 ENCOUNTER — OFFICE VISIT (OUTPATIENT)
Dept: PHYSICAL THERAPY | Facility: REHABILITATION | Age: 60
End: 2025-07-03
Payer: COMMERCIAL

## 2025-07-03 DIAGNOSIS — M25.552 LEFT HIP PAIN: Primary | ICD-10-CM

## 2025-07-03 PROCEDURE — 97140 MANUAL THERAPY 1/> REGIONS: CPT

## 2025-07-03 PROCEDURE — 97112 NEUROMUSCULAR REEDUCATION: CPT

## 2025-07-03 PROCEDURE — 97110 THERAPEUTIC EXERCISES: CPT

## 2025-07-03 NOTE — PROGRESS NOTES
"Daily Note     Today's date: 7/3/2025  Patient name: Mi Murphy  : 1965  MRN: [id number]  Referring provider: Shoenberger, Douglas C,*  Dx:   Encounter Diagnosis     ICD-10-CM    1. Left hip pain  M25.552           Start Time: 1730  Stop Time:   Total time in clinic (min): 43 minutes    Subjective: Pt reports overall her hip has been doing ok.  Was mowing her grass today so hip is feeling it a little bit.  Had been not too bad since her LV, until mowing the grass today.      Leaves for her trip to [place] next Monday.        Objective: See treatment diary below      Assessment: Tolerated treatment fair - well. Soft tissue density along L ant hip/TFL did feel improved compared to LV.  Did trial supine windshield wipers today in effort to further improve hip ER musculature.  Very slight OP during supine hip flexor stretch today.  Increased soreness/discomfort following this intervention, but described as tolerable.  Patient would benefit from continued PT.      Plan: Continue per plan of care.      Precautions: Anxiety, H/o bradyarrhythmia, OA, H/o SVT        Manuals 6/18 6/26 6/30 7/3         Gentle hip STM  8' - JY 10' [employer]  10'                                                Neuro Re-Ed             Hip isometrics Standing abd 7x5\" Supine hip flexion 10x5\" Supine hip adduction 10x5\" Supine hip flexion 2x10x5\" Supine hip adduction 10x5\" Supine hip flexion 1x10x5\" Supine hip adduction 15x5\"  Supine hip flexion 1x10x5\"          Clamshells  Hooklying 5x5\" OTB loop Hooklying  [place] loop  3\"  3x5 Hooklying  [place] loop  3\"  3x5                                                                          Ther Ex             Standing TFL stretch 3x30\" LLE Supine manual 3' - JY Supine hip flexor stretch  20\"x3 Supine hip flexor stretch  20\"x3         Recumbent bike  4' L1 5' L1 6' L1         Heel slides             Bridges  2x7x3\" 3x6x3\" 3x6x3\"         Windshield wipers    RMB  3x5                               "                  Ther Activity             Pt education Eval findings, POC, HEP - JY                         Gait Training             SPC  20 ft x2                         Modalities

## 2025-07-17 ENCOUNTER — APPOINTMENT (OUTPATIENT)
Dept: PHYSICAL THERAPY | Facility: REHABILITATION | Age: 60
End: 2025-07-17
Payer: COMMERCIAL

## 2025-07-23 ENCOUNTER — EVALUATION (OUTPATIENT)
Dept: PHYSICAL THERAPY | Facility: REHABILITATION | Age: 60
End: 2025-07-23
Payer: COMMERCIAL

## 2025-07-23 DIAGNOSIS — M25.552 LEFT HIP PAIN: Primary | ICD-10-CM

## 2025-07-23 PROCEDURE — 97112 NEUROMUSCULAR REEDUCATION: CPT

## 2025-07-23 PROCEDURE — 97140 MANUAL THERAPY 1/> REGIONS: CPT

## 2025-07-23 PROCEDURE — 97110 THERAPEUTIC EXERCISES: CPT

## 2025-07-23 NOTE — PROGRESS NOTES
Daily Note / PT Re-Evaluation     Today's date: 2025  Patient name: Mi Murphy  : 1965  MRN: [id number]  Referring provider: Shoenberger, Douglas C,*  Dx:   Encounter Diagnosis     ICD-10-CM    1. Left hip pain  M25.552           Start Time: 0940  Stop Time: 1020  Total time in clinic (min): 40 minutes    Assessment/Plan    Goals  Short term goals: to be met in 3-4 weeks  Pt independent with initial HEP, rationale, technique and frequency, for ROM and pain control. MET  Pt will report at least a 25% reduction in subjective pain complaints/symptoms to better manage ADLs and household chores, and so she can walk around [place] and [place] more comfortably. MET  Improve max pain level by 2 points on the numeric pain rating scale indicating a clinically significant reduction in pain level. MET    Long term goals: to be met by discharge  Pt will have full and pain free L hip ROM to better manage ADLs and household chores. MET  Pt will report a 75% or > reduction in subjective pain complaints/symptoms to better manage ADLs and household chores. MET  Improve B hip abduction MMT to a 4/5 or greater for improved lumbopelvic stability. NOT MET  Pt will improve FOTO score to equal to or better than expected outcome indicating an overall improvement in pain and function. MET  Pt will be able to perform ADLs, iADLS, and work duties without pain or restriction indicating return to PLOF. MET  Pt independent with rationale, technique and plan for performance of advanced HEP to ensure independent self-management of symptoms upon discharge. MET  Achieve pts therapy goal: Pt would like her pain to go away. NOT MET    Plan  Pt was discharged from physical therapy today.    Subjective    Pt reports feeling like her symptoms have improved 80% since beginning physical therapy.    The pain no longer stops her in her tracks anymore.    She has not really carried any boxes up or down the stairs while packing and is leaving  them for her movers.    She reports that she was able to walk around [place] for 8-9 days, and did okay. One day she even walked 12 miles. She was sore at the end of the day. She reports that her soreness was much less than before. She also was not getting the sharp pain when she stepped anymore. She also did not have to use a walking stick or cane while she was there. She did take a couple breaks when she needed to.    She is still taking a combination of Motrin and Tylenol which she only takes at night when she goes to bed.    Standing, walking and climbing stairs are not too difficult for her anymore. She is able to be on her feet for a few hours before her hip starts to bother her.    She reports that today is her last day of physical therapy and that she would like to be discharged because she will be moving down to [place] at the end of the week.      Pain  Current pain ratin  At best pain ratin  At worst pain ratin  Location: L anterior and lateral hip and anterior thigh  Quality: soreness      Objective      Palpation: No longer TTP of L anterior hip and groin and lateral near greater trochanter. No tension of L quadriceps, hip flexor and TFL muscles    Gait: no longer antalgic      Lumbar  % of normal   SB Left 100%, No tightness in L hip           MMT          PROM    Hip       L       R      L     R   Flex. 4+, no pain 4+ 120 degrees, no pain    Extn. 3 3     Abd. 3+, no pain 4-     IR.   40 degrees, no pain    ER.   45 degrees           Knee       Extension 4, no pain 5     Flexion 4 4          Hip:   scour=   negative on L for hip pain      lit = negative on L for hip pain        FOTO Score:    Left hip  Date: 25    Score: 99/67       Precautions: Anxiety, H/o bradyarrhythmia, OA, H/o SVT        Manuals 6/18 6/26 6/30 7/3 7/23        Gentle hip STM  8' - JY 10' [employer]  10'         Assessment     RE-JY                                  Neuro Re-Ed             Hip isometrics  "Standing abd 7x5\" Supine hip flexion 10x5\" Supine hip adduction 10x5\" Supine hip flexion 2x10x5\" Supine hip adduction 10x5\" Supine hip flexion 1x10x5\" Supine hip adduction 15x5\"  Supine hip flexion 1x10x5\"  Supine hip flexion 15x5\"         Clamshells  Hooklying 5x5\" OTB loop Hooklying  [place] loop  3\"  3x5 Hooklying  [place] loop  3\"  3x5 Hooklying  [place] loop  5\"  3x5                                                                         Ther Ex             Standing TFL stretch 3x30\" LLE Supine manual 3' - JY Supine hip flexor stretch  20\"x3 Supine hip flexor stretch  20\"x3 Supine hip flexor stretch  20\"x3        Recumbent bike  4' L1 5' L1 6' L1         Heel slides             Bridges  2x7x3\" 3x6x3\" 3x6x3\"         Doug wipers    RMB  3x5 RMB  3x5                                               Ther Activity             Pt education Eval findings, POC, HEP - JY    Eval findings, HEP - JY                     Gait Training             SPC  20 ft x2                         Modalities                                            "